# Patient Record
Sex: FEMALE | Race: WHITE | NOT HISPANIC OR LATINO | Employment: FULL TIME
[De-identification: names, ages, dates, MRNs, and addresses within clinical notes are randomized per-mention and may not be internally consistent; named-entity substitution may affect disease eponyms.]

---

## 2017-09-17 ENCOUNTER — HEALTH MAINTENANCE LETTER (OUTPATIENT)
Age: 32
End: 2017-09-17

## 2020-08-11 ENCOUNTER — VIRTUAL VISIT (OUTPATIENT)
Dept: SURGERY | Facility: CLINIC | Age: 35
End: 2020-08-11
Payer: COMMERCIAL

## 2020-08-11 VITALS — BODY MASS INDEX: 45.99 KG/M2 | WEIGHT: 293 LBS | HEIGHT: 67 IN

## 2020-08-11 DIAGNOSIS — E66.813 CLASS 3 SEVERE OBESITY DUE TO EXCESS CALORIES WITH SERIOUS COMORBIDITY AND BODY MASS INDEX (BMI) OF 60.0 TO 69.9 IN ADULT (H): Primary | ICD-10-CM

## 2020-08-11 DIAGNOSIS — E66.01 CLASS 3 SEVERE OBESITY DUE TO EXCESS CALORIES WITH SERIOUS COMORBIDITY AND BODY MASS INDEX (BMI) OF 60.0 TO 69.9 IN ADULT (H): Primary | ICD-10-CM

## 2020-08-11 RX ORDER — TOPIRAMATE 25 MG/1
TABLET, FILM COATED ORAL
Qty: 90 TABLET | Refills: 1 | Status: SHIPPED | OUTPATIENT
Start: 2020-08-11 | End: 2020-11-02

## 2020-08-11 SDOH — HEALTH STABILITY: MENTAL HEALTH: HOW OFTEN DO YOU HAVE A DRINK CONTAINING ALCOHOL?: NEVER

## 2020-08-11 ASSESSMENT — PAIN SCALES - GENERAL: PAINLEVEL: NO PAIN (0)

## 2020-08-11 ASSESSMENT — MIFFLIN-ST. JEOR: SCORE: 2597.38

## 2020-08-11 NOTE — PROGRESS NOTES
"Grecia MORALEZ is a 35 year old female who is being evaluated via a billable video visit.      The patient has been notified of following:     \"This video visit will be conducted via a call between you and your physician/provider. We have found that certain health care needs can be provided without the need for an in-person physical exam.  This service lets us provide the care you need with a video conversation.  If a prescription is necessary we can send it directly to your pharmacy.  If lab work is needed we can place an order for that and you can then stop by our lab to have the test done at a later time.    Video visits are billed at different rates depending on your insurance coverage.  Please reach out to your insurance provider with any questions.    If during the course of the call the physician/provider feels a video visit is not appropriate, you will not be charged for this service.\"    Patient has given verbal consent for Video visit? Yes  How would you like to obtain your AVS? MyChart  If you are dropped from the video visit, the video invite should be resent to: Text to cell phone: [phone]  Will anyone else be joining your video visit? No        Video-Visit Details    Type of service:  Video Visit    Video Start Time: 8:36 AM -9:01 (disconnected on Buzz[employer])  Video End Time:9:04- 9:26 AM  (finished visit with telephone)     Originating Location (pt. Location): Home    Distant Location (provider location):  [facility]     Platform used for Video Visit: Starteed    New Bariatric Nutrition Consultation Note    Reason For Visit: Nutrition Assessment    Grecia MORALEZ is a 35 year old presenting today for new bariatric nutrition consult.   Pt is interested in laparoscopic sleeve gastrectomy with Dr. Gomez expected surgery in Nov 2020.  Patient is accompanied by self.  This is pt's first of 3 required nutrition visits prior to surgery.     Pt referred by Cierra Caballero NP on " "August 11, 2020.  Patient with Co-morbidities of obesity including:  Type II DM no  Renal Failure no  Sleep apnea no  Hypertension no   Dyslipidemia no  Joint pain no  Back pain no  GERD yes     Support System Reviewed With Patient 8/6/2020   Who do you have in your support network that can be available to help you for the first 2 weeks after surgery? Yes.   Who can you count on for support throughout your weight loss surgery journey? Yes.       ANTHROPOMETRICS:  Estimated body mass index is 64.22 kg/m  as calculated from the following:    Height as of an earlier encounter on 8/11/20: 1.702 m (5' 7\").    Weight as of an earlier encounter on 8/11/20: 186 kg (410 lb).    Required weight loss goal pre-op: 41 lbs from initial consult weight (goal weight 369 lbs or less before surgery)       8/6/2020   I have tried the following methods to lose weight Watching portions or calories, Exercise, Slimfast       Weight Loss Questions Reviewed With Patient 8/6/2020   How long have you been overweight? 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 Works [employer].   At home she has 3 kids and a , and feel like she has no time for herself.     She has previously lost weight with meal prepping but it is not always sustainable. She grabs and goes during the work day, some day she does not eat until diner time. Will snack before and after dinner.      Recall Diet Questions Reviewed With Patient 8/6/2020   Describe what you typically consume for breakfast (typical or most recent): Nothing   Describe what you typically consume for lunch (typical or most recent): Sand, chips, water   Describe what you typically consume for supper (typical or most recent): Meat, veggie, pot/noodles   Describe what you typically consume as snacks (typical or most recent): Granola bars, crackers   How many ounces of " water, or other low calorie drinks, do you drink daily (8 oz=1 glass)? 48 oz   How many ounces of caffeine (coffee, tea, pop) do you drink daily (8 oz=1 glass)? 8 oz   How many ounces of carbonated (pop, beer, sparkling water) drinks do you drinky daily (8 oz=1 glass)? 16 oz   How many ounces of juice, pop, sweet tea, sports drinks, protein drinks, other sweetened drinks, do you drink daily (8 oz=1 glass)? 8 oz   How many ounces of milk do you drink daily (8 oz=1 glass) 8 oz   Please indicate the type of milk: 1%   How often do you drink alcohol? Never       Eating Habits 8/6/2020   Do you have any dietary restrictions? No   Do you currently binge eat (eat a large amount of food in a short time)? Yes   Are you an emotional eater? Yes   Do you get up to eat after falling asleep? No   What foods do you crave? Junk.       ADDITIONAL INFORMATION:    Dining Out History Reviewed With Patient 8/6/2020   How often do you dine out? Rarely.   Where do you dine out? (select all that apply) take out   What types of food do you order when you dine out? Chicken Sand's, fries       Physical Activity Reviewed With Patient 8/6/2020   How often do you exercise? 3 to 4 times per week   What is the duration of your exercise (in minutes)? 30 Minutes   What types of exercise do you do? walking   What keeps you from being more active?  Pain, Shortness of breath, Worried people will look at me       MALNUTRITION  Video Visit: Visual NFPE from shoulders up   % Intake: No decreased intake noted  % Weight Loss: None noted  Subcutaneous Fat Loss: None observed  Muscle Loss: None observed  Fluid Accumulation/Edema: Unable to assess  Malnutrition Diagnosis: Patient does not meet two of the established criteria necessary for diagnosing malnutrition    NUTRITION DIAGNOSIS:  Obesity r/t long history of self-monitoring deficit and excessive energy intake aeb BMI >30 kg/m2.    INTERVENTION:  Intervention Provided/Education Provided on post-op diet  "guidelines, vitamins/minerals essential post-operatively, GI anatomy of bariatric surgeries, ways to help prepare for post-op diet guidelines pre-operatively, portion/calorie-control, mindful eating and sources of protein.  Provided pt with list of goals RD contact information.      Questions Reviewed With Patient 2020   How ready are you to make changes regarding your weight? Number 1 = Not ready at all to make changes up to 10 = very ready. 10   How confident are you that you can change? 1 = Not confident that you will be successful making changes up to 10 = very confident. 10       Patient Understanding: good  Expected Compliance: good    GOALS:  Relating To Eatin. Eat slowly (20-30 minutes per meal), chewing foods well (25 chews per bite/applesauce consistency)  2. 9\" Plate method (1/2 plate non-starchy vegetables/fruit, 1/4 plate lean protein, 1/4 plate whole grain starch - no more than 1/2 cup carb/meal)  3. Eat 3 meals per day  - Breakfast try a protein shake as discussed     *Protein Shake Criteria: no more than 210 Calories, at least 20 grams of protein, and less than 10 grams of sugar     Meal Replacement Shake Options:   [facility] smoothie (160 Calories, 20 g protein)   Premier Protein (160 Calories, 30 g protein)  Slim Fast Advanced Nutrition (180 Calories, 20 g protein)  Muscle Milk, lactose-free, 17 oz bottle (210 Calories, 30 g protein)  Integrated Supplements, no artificial sugars (110 Calories, 20 g protein)  Genepro, unflavored protein powder (60 Calories, 30 g protein)    Meal Replacement Bar Options:  [facility] Protein Shake (160 Calories, 15 g protein)  Quest Protein Bars (190 Calories, 20 g protein)  Built Bar (170 Calories, 15-20 g protein)  One Protein Bar (210 calories, 20 g protein)  [place] Signature Protein Bar ([employer]) (190 Calories, 21 g protein)  Pure Protein Bars (180 Calories, 21 g protein)    You may purchase meal replacement products online at our e-store. Visit " e-store at [email or web address]   - Recommend the [facility] protein shakes (based powder + flavor packet), if interested Recommend trying the variety box to see flavor preferences.  - For recipes and ideas on how to use products, visit - SparkBase    4. Meals prep on Sunday for lunches:   - Purchase and portion out to go items:  -- apples, banana, oranges, portion out grapes or berries  -- Vegetables and dip (have vegetables clean and cut and dip portioned out  -- Single serving Georgian or Romanian (sykr) yogurt or cottage cheese   -- Sting cheese   -- 5-6 crackers and 2-3 slices of cheese  -- deli meat and a piece of string cheese   -- hard boiled eggs or egg salad with vegetables and 5-6 crackers     Relating to beverages:  Reduce caffeine/carbonation/calorie containing beverages  Separate fluids from meals by 30 minutes before, during, and after eating    Relating to dietary supplements:  Start a multivitamin containing iron daily    Initial Consult / Weight Loss    My Plate Planner_English - Pt Education  [email or web address]    Protein Sources for Weight Loss  [email or web address]       Time spent with patient: 46 minutes.  Halie Rosado, MS, RD, LD

## 2020-08-11 NOTE — PROGRESS NOTES
"Grecia MORALEZ is a 35 year old female who is being evaluated via a billable video visit.      The patient has been notified of following:     \"This video visit will be conducted via a call between you and your physician/provider. We have found that certain health care needs can be provided without the need for an in-person physical exam.  This service lets us provide the care you need with a video conversation.  If a prescription is necessary we can send it directly to your pharmacy.  If lab work is needed we can place an order for that and you can then stop by our lab to have the test done at a later time.    Video visits are billed at different rates depending on your insurance coverage.  Please reach out to your insurance provider with any questions.    If during the course of the call the physician/provider feels a video visit is not appropriate, you will not be charged for this service.\"    Patient has given verbal consent for Video visit? Yes  How would you like to obtain your AVS? MyChart  If you are dropped from the video visit, the video invite should be resent to: Text to cell phone: [phone]  Will anyone else be joining your video visit? No      Video-Visit Details    Type of service:  Video Visit    Video Start Time: 802  Video End Time: 832    Originating Location (pt. Location): Home    Distant Location (provider location):  [facility]     Platform used for Video Visit: Kartik Caballero NP          New Bariatric Surgery Consultation Note    2020    RE: Grecia MORALEZ  MR#: [id number]  : 1985      Referring provider: No flowsheet data found.    Chief Complaint/Reason for visit: evaluation for possible weight loss surgery    Dear Kayla Arcos (General),    I had the pleasure of seeing your patient, Grecia MORALEZ, to evaluate her obesity and consider her for possible weight loss surgery. As you know, Grecia MORALEZ is 35 year " "old.  She has a height of 5' 7\", a weight of 410 lbs 0 oz, and calculated Body mass index is 64.22 kg/m .      HISTORY OF PRESENT ILLNESS:  Weight Loss History Reviewed with Patient 2020   How long have you been overweight? From Middle age and beyond   What is the most that you have ever weighed? 420   What is the most weight you have lost? 0   I have tried the following methods to lose weight Watching portions or calories, Exercise, Slimfast   I have tried the following weight loss medications? (Check all that apply) None   Have you ever had weight loss surgery? No     Has always struggled with weight- gradual over time   More weight gain with each child (3boys)   Has never been able to lose weight with diet/ exercise     Has been considering surgery for a while. Several friends have had surgery     CO-MORBIDITIES OF OBESITY INCLUDE:     2020   I have the following health issues associated with obesity: None of the above   I have the following symptoms associated with obesity: Lower Extremity Swelling, Fatigue     GERD: well controlled omeprazole once daily - 5-10 year history, worse with specific foods and carbonation     Sleep: Snores, no difficulty staying asleep, wakes rested, doesn't stop breathing at night    No history blood clots     PAST MEDICAL HISTORY:  No past medical history on file.    PAST SURGICAL HISTORY:  Past Surgical History:   Procedure Laterality Date     AS REMOVAL OF OVARIAN CYST(S)        SECTION      x2       FAMILY HISTORY:   No family history on file.    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 Yes   Who do you have in your support network that can be available to help you for the first 2 weeks after surgery? " Yes.   Who can you count on for support throughout your weight loss surgery journey? Yes.   Can you afford 3 meals a day?  Yes   Can you afford 50-60 dollars a month for vitamins? Yes       HABITS:     8/6/2020   How often do you drink alcohol? Never   Have you ever used any of the following nicotine products? No   Have you or are you currently using street drugs or prescription strength medication for which you do not have a prescription for? No   Do you have a history of chemical dependency (alcohol or drug abuse)? No       PSYCHOLOGICAL HISTORY:   Psychological History Reviewed With Patient 8/6/2020   Have you ever attempted suicide? Never.   Have you had thoughts of suicide in the past year? No   Have you ever been hospitalized for mental illness or a suicide attempt? Never.   Do you have a history of chronic pain? No   Have you ever been diagnosed with fibromyalgia? No   Are you currently seeing a therapist or counselor?  No   Are you currently seeing a psychiatrist? No       ROS:     8/6/2020   Skin:  None of the above   HEENT: None of these   Musculoskeletal: None of the above   Cardiovascular: Shortness of breath with activity   Pulmonary: Shortness of breath with activity, Snoring   Gastrointestinal: Reflux   Genitourinary: None of the above   Hematological: None of the above   Neurological: None of the above   Female only: None of the above       EATING BEHAVIORS:     8/6/2020   Have you or anyone else thought that you had an eating disorder? Yes   If you answered yes to the previous eating disorder question, select the types that apply from this list: Binge Eating   Do you currently binge eat (eat a large amount of food in a short time)? Yes   Are you an emotional eater? Yes   Do you get up to eat after falling asleep? No     Doesn't make time to make healthy decisions  Grabbing and going- busy lifestyle   emotional eating  Snacking throughout the day - doesn't eat true meal until dinner   Done eating at  "dinner time   Lately has been feeling fatigued after latest meal of the day - blah   Important to eat what ever is in front of her   If eats breakfast- very hungry all day       EXERCISE:     8/6/2020   How often do you exercise? 3 to 4 times per week   What is the duration of your exercise (in minutes)? 30 Minutes   What types of exercise do you do? walking   What keeps you from being more active?  Pain, Shortness of breath, Worried people will look at me       MEDICATIONS:  Current Outpatient Medications   Medication Sig Dispense Refill     omeprazole (PRILOSEC) 20 MG DR capsule Take 20 mg by mouth as needed       Has taken topamax with benefit at 25 mg     ALLERGIES:  Allergies   Allergen Reactions     Latex Rash       LABS/IMAGING/MEDICAL RECORDS REVIEW:     PHYSICAL EXAM:  Ht 1.702 m (5' 7\")   Wt (!) 186 kg (410 lb)   BMI 64.22 kg/m        In summary, Grecia MORALEZ has Class III obesity with a body mass index of Body mass index is 64.22 kg/m . kg/m2 and the comorbidities stated above. She completed an informational seminar and is a candidate for the laparoscopic gastric sleeve.  She will have to complete the following pre-requisites:    Received weight loss goal of 41 lb prior to surgery. 369  3 dietitian visits- recommend monthly until surgery   Have preoperative laboratory tests drawn.  Psychological Evaluation with MMPI and clearance for weight loss surgery.  Letter of clearance from the following EGD, primary care (address blood pressure)   Starting topamax- taper to 75mg, has tolerated 25 mg in the past. No history of kidney stones or glaucoma  GLP1- consider in future if DM  Labs need to be sent to [facility]     Is patient currently taking narcotic/opioid medications? No    Today in the office we discussed gastric sleeve surgery. Preoperative, perioperative, and postoperative processes, management, and follow up were addressed.  Risks and benefits were outlined including the risk of death, staple line " leak (1-2%), PE, DVT, ulcer, worsening GERD, N/V, stricture, hernia, wound infection, weight regain, and vitamin deficiencies. I emphasized exercise and activity along with appropriate food choice as the main foundation for weight loss with surgery providing surgical reinforcement of this.  All questions were answered.  A goal sheet and support group handout were given to the patient.    Once the patient has completed the requirements in their task list and there are no further recommendations, the pt will be allowed to see the surgeon of her choice for consultation on the laparoscopic gastric sleeve surgery. Patient verbalizes understanding of the process to surgery and expectations for the postoperative period including the need for lifelong lifestyle changes, vitamin supplementation, and laboratory monitoring.    PCP is Kayla Arcos 9/2019- recommendation letter written, prior to bariatric consult. will need updated   Sincerely,     Cierra Caballero NP    I spent a total of 30 minutes face to face with the patient during today's office visit. Over 50% of this time was spent counseling the patient and/or coordinating care.      Bariatric Task List  Status:  Is patient a candidate for bariatric surgery?:  patient is a candidate for bariatric surgery -     Cleared to schedule surgeon consult?:    -     Status:    -     Surgeon: Dr. Gomez  -     Tentative surgery month/year: November 2020 -        Insurance: Insurance:  [place]  -     Cigna: PCP Recommendation and Medical Clearance:    -     HP Referral:    -     Other:    -        Patient Info: Initial Weight:  410 -     Date of Initial Weight/Height:  8/11/2020 -     Goal Weight (lbs):  369 -     Required Weight Loss:  41 -     Surgery Type:  sleeve gastrectomy -     Multidisciplinary Meeting:    -        Dietician Visits: Structured weight loss required by insurance?:  no structured weight loss required -     Dietician Visit 1:  Needed -     Dietician Visit 2:   "Needed -     Dietician Visit 3:  Needed -     Dietician Visit 4:    -     Dietician Visit 5:    -     Dietician Visit 6:    -     Dietician Visit additional:    -     Clearance from dietician to see surgeon?:    -     Dietician Notes:    -        Psychological Evaluation: Psych eval:  Needed -     Therapist letter of support:    -     Psychiatrist letter of support:    -     Establish care with therapist:    -     Complete eating disorder evaluation:    -     Letter of clearance from therapist/eating disorder program:    -     Other:    -        Lab Work: Complete Blood Count:  Needed -     Comprehensive Metabolic Panel:  Needed -     Vitamin D:  Needed -     Hgb A1c:  Needed -     PTH:  Needed -     H. pylori:    -     TSH:  Needed -     Nicotine Testing:    -     Other:    -        Consults/ Clearance: Sleep Medicine:    -     Cardiac:    -     Pain:   -     Dental:    -     Endocrine:    -     Gastroenterology:    -     Vascular Medicine:    -     Hematology:    -     Medical Weight Management:   -     Physical Therapy/Exercise:    -     Nephrology:    -     Neurology:    -     Pulmonology:    -     Rheumatology:    -     Other    -     Other    -     Other    -           Testing: UGI:    -     EGD:  Needed -     Other:    -        PCP: Establish care with PCP:  Completed -     Follow up with PCP:    -     PCP letter of support:  Needed -        Smoking: Quit tobacco use (3 months smoke free)?:    -     Quit date:    -        Patient Education:  Information Session:  Completed -     Given \"Making your decision\" handout?:  Given \"\"Making your decision\"\" handout? -     Given support group information?:  support group contact information provided -     Attended support group?:    -     Support plan in place?:  Completed -     Research consents signed?:    -        Additional Surgery Requirements: Review Coag plan:    -     HgA1c <8:    -     Inpatient pain consult:    -     Final nicotine screen:    -     Dental " work complete:    -     Birth control plan:    -     Other Requirements:    -     Other Requirements:    -        Final Tasks:  Before surgery online class:  Needed -     Before surgery online class website link:  [email or web address]   After surgery online class:  Needed -     After surgery online class website link:  [email or web address]   Nurse visit for weigh-in and information:  Needed -     Pre-assessment clinic visit with anesthesia team for H&P:  Needed -     Final labs (Hgb, plt, T&S, UA):  Needed -        Notes:   -

## 2020-08-11 NOTE — NURSING NOTE
"Weight Problem   New Bariatric Surgery      ( Weight: (!) 186 kg (410 lb)(Pt reported) )  ( Height: 170.2 cm (5' 7\") )  ( BMI (Calculated): 64.21 )  (   )  ( Cumulative weight loss (lbs): 0 )  (   )  (   )  (   )  (   )    (   )  (   )  (   )  (   )  (   )  (   )  (   )    ( There is no problem list on file for this patient.   )  (   No current outpatient medications on file.    )  ( Diabetes Eval:    )    ( Pain Eval:  No Pain (0) )    ( Wound Eval:       )    (   History   Smoking Status     Never Smoker   Smokeless Tobacco     Never Used    )    ( Signed By:  Sowmya Petersen CMA; August 11, 2020; 7:53 AM )    "

## 2020-08-11 NOTE — LETTER
"8/11/2020       RE: Grecia MORALEZ  [address]  [facility] [address]     Dear Colleague,    Thank you for referring your patient, Grecia MORALEZ, to the [facility]IGHT MANAGEMENT at [facility]. Please see a copy of my visit note below.    Grecia MORALEZ is a 35 year old female who is being evaluated via a billable video visit.        Video-Visit Details    Type of service:  Video Visit    Video Start Time: 8:36 AM -9:01 (disconnected on [employer])  Video End Time:9:04- 9:26 AM  (finished visit with telephone)     Originating Location (pt. Location): Home    Distant Location (provider location):  [facility]GICAL [facility] MANAGEMENT     Platform used for Video Visit: [employer]    New Bariatric Nutrition Consultation Note    Reason For Visit: Nutrition Assessment    Grecia MORALEZ is a 35 year old presenting today for new bariatric nutrition consult.   Pt is interested in laparoscopic sleeve gastrectomy with Dr. Gomez expected surgery in Nov 2020.  Patient is accompanied by self.  This is pt's first of 3 required nutrition visits prior to surgery.     Pt referred by Cierra Caballero NP on August 11, 2020.  Patient with Co-morbidities of obesity including:  Type II DM no  Renal Failure no  Sleep apnea no  Hypertension no   Dyslipidemia no  Joint pain no  Back pain no  GERD yes     Support System Reviewed With Patient 8/6/2020   Who do you have in your support network that can be available to help you for the first 2 weeks after surgery? Yes.   Who can you count on for support throughout your weight loss surgery journey? Yes.       ANTHROPOMETRICS:  Estimated body mass index is 64.22 kg/m  as calculated from the following:    Height as of an earlier encounter on 8/11/20: 1.702 m (5' 7\").    Weight as of an earlier encounter on 8/11/20: 186 kg (410 lb).    Required weight loss goal pre-op: 41 lbs from initial consult weight (goal weight 369 lbs or less before " surgery)       8/6/2020   I have tried the following methods to lose weight Watching portions or calories, Exercise, Slimfast       Weight Loss Questions Reviewed With Patient 8/6/2020   How long have you been overweight? 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 Works M-Fri.   At home she has 3 kids and a , and feel like she has no time for herself.     She has previously lost weight with meal prepping but it is not always sustainable. She grabs and goes during the work day, some day she does not eat until diner time. Will snack before and after dinner.      Recall Diet Questions Reviewed With Patient 8/6/2020   Describe what you typically consume for breakfast (typical or most recent): Nothing   Describe what you typically consume for lunch (typical or most recent): Sand, chips, water   Describe what you typically consume for supper (typical or most recent): Meat, veggie, pot/noodles   Describe what you typically consume as snacks (typical or most recent): Granola bars, crackers   How many ounces of water, or other low calorie drinks, do you drink daily (8 oz=1 glass)? 48 oz   How many ounces of caffeine (coffee, tea, pop) do you drink daily (8 oz=1 glass)? 8 oz   How many ounces of carbonated (pop, beer, sparkling water) drinks do you drinky daily (8 oz=1 glass)? 16 oz   How many ounces of juice, pop, sweet tea, sports drinks, protein drinks, other sweetened drinks, do you drink daily (8 oz=1 glass)? 8 oz   How many ounces of milk do you drink daily (8 oz=1 glass) 8 oz   Please indicate the type of milk: 1%   How often do you drink alcohol? Never       Eating Habits 8/6/2020   Do you have any dietary restrictions? No   Do you currently binge eat (eat a large amount of food in a short time)? Yes   Are you an emotional eater? Yes   Do you get up to eat after falling  asleep? No   What foods do you crave? Junk.       ADDITIONAL INFORMATION:    Dining Out History Reviewed With Patient 2020   How often do you dine out? Rarely.   Where do you dine out? (select all that apply) take out   What types of food do you order when you dine out? Chicken Sand's, fries       Physical Activity Reviewed With Patient 2020   How often do you exercise? 3 to 4 times per week   What is the duration of your exercise (in minutes)? 30 Minutes   What types of exercise do you do? walking   What keeps you from being more active?  Pain, Shortness of breath, Worried people will look at me       MALNUTRITION  Video Visit: Visual NFPE from shoulders up   % Intake: No decreased intake noted  % Weight Loss: None noted  Subcutaneous Fat Loss: None observed  Muscle Loss: None observed  Fluid Accumulation/Edema: Unable to assess  Malnutrition Diagnosis: Patient does not meet two of the established criteria necessary for diagnosing malnutrition    NUTRITION DIAGNOSIS:  Obesity r/t long history of self-monitoring deficit and excessive energy intake aeb BMI >30 kg/m2.    INTERVENTION:  Intervention Provided/Education Provided on post-op diet guidelines, vitamins/minerals essential post-operatively, GI anatomy of bariatric surgeries, ways to help prepare for post-op diet guidelines pre-operatively, portion/calorie-control, mindful eating and sources of protein.  Provided pt with list of goals RD contact information.      Questions Reviewed With Patient 2020   How ready are you to make changes regarding your weight? Number 1 = Not ready at all to make changes up to 10 = very ready. 10   How confident are you that you can change? 1 = Not confident that you will be successful making changes up to 10 = very confident. 10       Patient Understanding: good  Expected Compliance: good    GOALS:  Relating To Eatin. Eat slowly (20-30 minutes per meal), chewing foods well (25 chews per bite/applesauce  "consistency)  2. 9\" Plate method (1/2 plate non-starchy vegetables/fruit, 1/4 plate lean protein, 1/4 plate whole grain starch - no more than 1/2 cup carb/meal)  3. Eat 3 meals per day  - Breakfast try a protein shake as discussed     *Protein Shake Criteria: no more than 210 Calories, at least 20 grams of protein, and less than 10 grams of sugar     Meal Replacement Shake Options:   [facility] smoothie (160 Calories, 20 g protein)   Premier Protein (160 Calories, 30 g protein)  Slim Fast Advanced Nutrition (180 Calories, 20 g protein)  Muscle Milk, lactose-free, 17 oz bottle (210 Calories, 30 g protein)  Integrated Supplements, no artificial sugars (110 Calories, 20 g protein)  Genepro, unflavored protein powder (60 Calories, 30 g protein)    Meal Replacement Bar Options:  [facility] Protein Shake (160 Calories, 15 g protein)  Quest Protein Bars (190 Calories, 20 g protein)  Built Bar (170 Calories, 15-20 g protein)  One Protein Bar (210 calories, 20 g protein)  [place] Signature Protein Bar ([employer]) (190 Calories, 21 g protein)  Pure Protein Bars (180 Calories, 21 g protein)    You may purchase meal replacement products online at our e-store. Visit e-store at [email or web address]   - Recommend the [facility] protein shakes (based powder + flavor packet), if interested Recommend trying the variety box to see flavor preferences.  - For recipes and ideas on how to use products, visit - [employer]    4. Meals prep on Sunday for lunches:   - Purchase and portion out to go items:  -- apples, banana, oranges, portion out grapes or berries  -- Vegetables and dip (have vegetables clean and cut and dip portioned out  -- Single serving Portuguese or Citizen of [place] (sykr) yogurt or cottage cheese   -- Sting cheese   -- 5-6 crackers and 2-3 slices of cheese  -- deli meat and a piece of string cheese   -- hard boiled eggs or egg salad with vegetables and 5-6 crackers     Relating to beverages:  Reduce " caffeine/carbonation/calorie containing beverages  Separate fluids from meals by 30 minutes before, during, and after eating    Relating to dietary supplements:  Start a multivitamin containing iron daily    Initial Consult / Weight Loss    My Plate Planner_English - Pt Education  [email or web address]    Protein Sources for Weight Loss  [email or web address]       Time spent with patient: 46 minutes.  Halie Rosado, MS, RD, LD

## 2020-08-11 NOTE — Clinical Note
New Bariatric.   Tatyana, can you please send her surgery packet information? Clearances just from primary care and psych consult. Also needs EGD which is ordered.   Thanks!

## 2020-08-11 NOTE — PATIENT INSTRUCTIONS
"GOALS:  Relating To Eatin. Eat slowly (20-30 minutes per meal), chewing foods well (25 chews per bite/applesauce consistency)  2. 9\" Plate method (1/2 plate non-starchy vegetables/fruit, 1/4 plate lean protein, 1/4 plate whole grain starch - no more than 1/2 cup carb/meal)  3. Eat 3 meals per day  - Breakfast try a protein shake as discussed     *Protein Shake Criteria: no more than 210 Calories, at least 20 grams of protein, and less than 10 grams of sugar     Meal Replacement Shake Options:   [facility] smoothie (160 Calories, 20 g protein)   Premier Protein (160 Calories, 30 g protein)  Slim Fast Advanced Nutrition (180 Calories, 20 g protein)  Muscle Milk, lactose-free, 17 oz bottle (210 Calories, 30 g protein)  Integrated Supplements, no artificial sugars (110 Calories, 20 g protein)  Genepro, unflavored protein powder (60 Calories, 30 g protein)    Meal Replacement Bar Options:  [facility] Protein Shake (160 Calories, 15 g protein)  Quest Protein Bars (190 Calories, 20 g protein)  Built Bar (170 Calories, 15-20 g protein)  One Protein Bar (210 calories, 20 g protein)  [place] Signature Protein Bar ([employer]) (190 Calories, 21 g protein)  Pure Protein Bars (180 Calories, 21 g protein)    You may purchase meal replacement products online at our e-store. Visit e-store at [email or web address]   - Recommend the [facility] protein shakes (based powder + flavor packet), if interested Recommend trying the variety box to see flavor preferences.  - For recipes and ideas on how to use products, visit - [employer]    4. Meals prep on  for lunches:   - Purchase and portion out to go items:  -- apples, banana, oranges, portion out grapes or berries  -- Vegetables and dip (have vegetables clean and cut and dip portioned out  -- Single serving [place]istanian or [place]ian (sykr) yogurt or cottage cheese   -- Sting cheese   -- 5-6 crackers and 2-3 slices of cheese  -- deli meat and a piece of string cheese   -- " hard boiled eggs or egg salad with vegetables and 5-6 crackers     Relating to beverages:  Reduce caffeine/carbonation/calorie containing beverages  Separate fluids from meals by 30 minutes before, during, and after eating    Relating to dietary supplements:  Start a multivitamin containing iron daily    Initial Consult / Weight Loss    My Plate Planner_English - Pt Education  [email or web address]    Protein Sources for Weight Loss  [email or web address]     Follow up with RD in one month    Halie Rosado, MS, RD, LD  If you need to schedule or reschedule with a dietitian please call [phone].

## 2020-08-11 NOTE — PATIENT INSTRUCTIONS
"Thank you for allowing us the privilege of caring for you. We hope we provided you with the excellent service you deserve.   Please let us know if there is anything else we can do for you so that we can be sure you are completely satisfied with your care experience.    To ensure the quality of our services you may be receiving a patient satisfaction survey from an independent patient satisfaction monitoring company.    The greatest compliment you can give is a \"Likely to Recommend\"    Your visit was with Cierra Caballero NP today.    Instructions per today's visit:   Read Bariatric Surgery packet- in [facility] message  Dietitian visits x 3- recommend monthly until surgery   See Dr. Gomez in 1 month - in person or virtual   Schedule Psych Consult - see [facility] message   Schedule Clearances- Upper Endoscopy  Follow up with me in 1-2 month - med check   Labs ordered will be faxed to your local clinic   Complete HARRIETT and send back to clinic   Start Topamax     Call Ronak Saba at [phone] to discuss insurance coverage for bariatric surgery.  Please check with your insurance regarding bariatric surgery coverage also.        Please call our contact center at [phone] to schedule your next appointments.  To find a lab location near you, please call [phone].  For any nursing questions or concerns call Annabelle Mark LPN at [phone] or Tatyana Don RN at [phone]  Please call during clinic hours Monday through Friday 8:00a - 4:00p if you have questions or you can contact us via LiveRSVP at anytime and we will reply during clinic hours.    Lab results will be communicated through My Chart or letter (if My Chart not used). Please call the clinic if you have not received communication after 1 week or if you have any questions.?  Clinic Fax: [phone]  ______________________________________________________________________________________________________________________  Meal Replacement Products:    Here is " the link to our new e-store where you can purchase our meal replacement products     [email or web address] [place] E-Store  [email or web address]    The one week starter kit is a great way to sample a variety of products and see what works for you.    If you want more information about the product go to: [email or web address]    If you are an employee or [facility] or [facility] please contact your care team for a 10% estore discount    Free Shipping for orders over $75     Benefits of meal replacements products:    Portion and calorie control  Improved nutrition  Structured eating  Simplified food choices  Avoid contact with trigger foods  _________________________________________________________________________________________________________________________________  Interested in working with a health ?  Health coaches work with you to improve your overall health and wellbeing.  They look at the whole person, and may involve discussion of different areas of life, including, but not limited to the four pillars of health (sleep, exercise, nutrition, and stress management). Discuss with your care team if you would like to start working a health .  Health Coaching-3 Pack: Schedule by calling [phone]    $99 for three health coaching visits    Visits may be done in person or via phone    Coaching is a partnership between the  and the client; Coaches do not prescribe or diagnose    Coaching helps inspire the client to reach his/her personal goals   _________________________________________________________________________________________________________________________________  Healthy Lifestyle Support Group   [facility] Weight Management Program  Virtual Support Group Now Available    60 minutes of small group guided discussion, support and resources    Led by Health , Cristy Harden    For questions, and to receive the invite information, contact Cristy coto  [email or web address]    All our welcome!    One Friday per month, 12:30pm to 1:30pm  SELF COMPASSION: July 31st  CREATING MY WELLNESS VISION: August 28th  MINDFULNESS PRACTICES FOR A CALM BODY & MIND: September 25th  MINDFUL EATING TOOLS: October 30th  HEALTHY& HAPPY HOLIDAYS: November 20th  OPEN FORUM: December 18th  _______________________________________________________________________________________________________________________________  Connections: Bariatric Care support group  Due to the Covid-19 restrictions, we will be doing our support group virtually using Microsoft Teams. You will need to get an invitation to the group from Jonny Bonilla, Ph.D., LP at [email or web address] and to learn about using Microsoft Teams. The next meeting is on Tuesday, July 14 between 6:30 and 8 PM and there will be no formal speaker.    This group meets the second Tuesday of each month from 6:30 to 8 PM and will be held again at [facility] in the [address] [place] [address] (A-B room) when the Covid-19 restrictions are lifted. The group is led by Jonny Bonilla, Ph.D., Licensed Psychologist, [facility] Comprehensive Weight Management Program. There is no cost for group participation and is open to all [facility] (and those external to this program) pre- and post-operative bariatric surgery patients as well as their support system.   _________________________________________________________________________________________________________________________________  [facility] Get Moving Program  Our team of physical therapists is trained to help you understand and take control of your condition. They will perform a thorough evaluation to determine your ability for activity and develop a customized plan to fit your goals and physical ability.  Scheduling: Unsure if the Get Moving program is right for you? Discuss the program with your medical provider or diabetes educator.  You can also call us at [phone] to ask questions or schedule an appointment.   [facility] Get Moving Program  ______________________________________________________________________________________________________________________  [facility] Diabetes Prevention Program (DPP)  If you have prediabetes and Medicare please contact us by MyChart or phone to learn more about our Diabetes Prevention Program  _______________________________________________________________________________________________________________________  Bluetooth Scale:    We hope to provide you with high quality telephone and virtual healthcare visits while social distancing for COVID-19 is necessary, as well as in the future when virtual visits may be more convenient for you.     Our technology team made it possible for Bluetooth scales to send weight measurements to our electronic medical record. This allows weights from you weighing at home to securely flow into the medical record, which will improve telephone and virtual visits.   Additionally, studies have shown that adults actually lose more weight when their weights are automatically sent to someone else, and also that this process is not stressful for those adults.    Below is a link for purchasing the scale, with a discount code for our patients. You may call your insurance company to see if they will reimburse you for the cost of the scale, as a piece of durable medical equipment. The scales only go up to a weight of 400 pounds. This is an issue and we are working with the developer on increasing this. We found no scales that go over 400lb that have blue-tooth for connecting to [employer].    Scale to purchase: the [employer]  Body  Scale: [email or web address]    Discount Code: We have a discount code for our patients to bring the cost down to $50, please contact your care team for discount code  and instructions on how to set up scale.      Thank you,   [facility] Comprehensive Weight Management Team  Bariatric Task List  Status:  Is patient a candidate for bariatric surgery?:  patient is a candidate for bariatric surgery -     Cleared to schedule surgeon consult?:    -     Status:    -     Surgeon: Dr. Gomez  -     Tentative surgery month/year: November 2020 -        Insurance: Insurance:  [place]  -     Cigna: PCP Recommendation and Medical Clearance:    -     HP Referral:    -     Other:    -        Patient Info: Initial Weight:  410 -     Date of Initial Weight/Height:  8/11/2020 -     Goal Weight (lbs):  369 -     Required Weight Loss:  41 -     Surgery Type:  sleeve gastrectomy -     Multidisciplinary Meeting:    -        Dietician Visits: Structured weight loss required by insurance?:  no structured weight loss required -     Dietician Visit 1:  Needed -     Dietician Visit 2:  Needed -     Dietician Visit 3:  Needed -     Dietician Visit 4:    -     Dietician Visit 5:    -     Dietician Visit 6:    -     Dietician Visit additional:    -     Clearance from dietician to see surgeon?:    -     Dietician Notes:    -        Psychological Evaluation: Psych eval:  Needed -     Therapist letter of support:    -     Psychiatrist letter of support:    -     Establish care with therapist:    -     Complete eating disorder evaluation:    -     Letter of clearance from therapist/eating disorder program:    -     Other:    -        Lab Work: Complete Blood Count:  Needed -     Comprehensive Metabolic Panel:  Needed -     Vitamin D:  Needed -     Hgb A1c:  Needed -     PTH:  Needed -     H. pylori:    -     TSH:  Needed -     Nicotine Testing:    -     Other:    -        Consults/ Clearance: Sleep Medicine:    -     Cardiac:    -     Pain:   -     Dental:    -     Endocrine:    -     Gastroenterology:    -     Vascular Medicine:    -     Hematology:    -     Medical Weight Management:   -    "  Physical Therapy/Exercise:    -     Nephrology:    -     Neurology:    -     Pulmonology:    -     Rheumatology:    -     Other    -     Other    -     Other    -           Testing: UGI:    -     EGD:  Needed -     Other:    -        PCP: Establish care with PCP:  Completed -     Follow up with PCP:    -     PCP letter of support:  Needed -        Smoking: Quit tobacco use (3 months smoke free)?:    -     Quit date:    -        Patient Education:  Information Session:  Completed -     Given \"Making your decision\" handout?:  Given \"\"Making your decision\"\" handout? -     Given support group information?:  support group contact information provided -     Attended support group?:    -     Support plan in place?:  Completed -     Research consents signed?:    -        Additional Surgery Requirements: Review Coag plan:    -     HgA1c <8:    -     Inpatient pain consult:    -     Final nicotine screen:    -     Dental work complete:    -     Birth control plan:    -     Other Requirements:    -     Other Requirements:    -        Final Tasks:  Before surgery online class:  Needed -     Before surgery online class website link:  [email or web address]   After surgery online class:  Needed -     After surgery online class website link:  [email or web address]   Nurse visit for weigh-in and information:  Needed -     Pre-assessment clinic visit with anesthesia team for H&P:  Needed -     Final labs (Hgb, plt, T&S, UA):  Needed -        Notes:   -          MEDICATION STARTED AT THIS APPOINTMENT  We are starting topiramate at bedtime.  Start one tab, 25 mg, for a week. Go up to 50 mg (2 tabs) for the next week. At the third week, take   3 tabs (75 mg).  Stay at 3 tabs until you are seen again. Call the nurse at [phone] if you have any questions or concerns. (Do not stop taking it if you don't think it's working. For some people it works even though they do not feel much " different.)    Topiramate (Topamax) is a medication that is used most often to treat migraine headaches or for seizures. It has also been found to help with weight loss. Although it's not currently FDA approved for weight loss, it has been used safely for a number of years to help people who are carrying extra weight.     Just how topiramate helps with weight loss has not been exactly determined. However it seems to work on areas of the brain to quiet down signals related to eating.      Topiramate may make you:    >feel less interest in eating in between meals   >think less about food and eating   >find it easier to push the plate away   >find giving up pop easier    >have an easier time eating less    For some of our patients, the pills work right away. They feel and think quite differently about food. Other patients don't feel much of a change but find in fact they have lost weight! Like all weight loss medications, topiramate works best when you help it work.  This means:    1) Have less tempting high calorie (fattening) food around the house or office    2) Have lower calorie food (fruits, vegetables,low fat meats and dairy) for snacks    3) Eat out only one time or less each week.   4) Eat your meals at a table with the TV or computer off.    Side-effects. Topiramate is generally well tolerated. The main side-effects we see are:   Tingling in hands,feet, or face (usually not very troublesome)   Mental confusion and word finding trouble (about 10% of patients have this.)     Feeling sleepy or a bit dopey- this goes away very soon after starting.    One of the dangers of topiramate is the possibility of birth defects--if you get pregnant when you are on it, there is the risk that your baby will be born with a cleft lip or palate.  If you are on topiramate and of child bearing age, you need to be on a reliable form of birth control or refrain from sexual intercourse.     Please refer to the pharmacy insert for  more information on side-effects. Since many pharmacists are not familiar with the use of topiramate in weight loss, calling the clinic will get you the most accurate information on the use of this medication for weight loss.     In order to get refills of this or any medication we prescribe you must be seen in the medical weight mgmt clinic every 2-3 months. Please have your pharmacy fax a refill request to [phone].

## 2020-08-11 NOTE — LETTER
"2020       RE: Grecia MORALEZ  [address]  [place] [address]     Dear Colleague,    Thank you for referring your patient, Grecia MORALEZ, to the [facility] SURGICAL WEIGHT MANAGEMENT at [facility]. Please see a copy of my visit note below.    Grecia MORALEZ is a 35 year old female who is being evaluated via a billable video visit.          Video-Visit Details    Type of service:  Video Visit    Video Start Time: 802  Video End Time: 832    Originating Location (pt. Location): Home    Distant Location (provider location):  [facility] SURGICAL WE[facility] MANAGEMENT     Platform used for Video Visit: Kartik Caballero NP          New Bariatric Surgery Consultation Note    2020    RE: Grecia MORALEZ  MR#: [id number]  : 1985      Referring provider: No flowsheet data found.    Chief Complaint/Reason for visit: evaluation for possible weight loss surgery    Dear Kayla Arcos (General),    I had the pleasure of seeing your patient, Grecia MORALEZ, to evaluate her obesity and consider her for possible weight loss surgery. As you know, Grecia MORALEZ is 35 year old.  She has a height of 5' 7\", a weight of 410 lbs 0 oz, and calculated Body mass index is 64.22 kg/m .      HISTORY OF PRESENT ILLNESS:  Weight Loss History Reviewed with Patient 2020   How long have you been overweight? From Middle age and beyond   What is the most that you have ever weighed? 420   What is the most weight you have lost? 0   I have tried the following methods to lose weight Watching portions or calories, Exercise, Slimfast   I have tried the following weight loss medications? (Check all that apply) None   Have you ever had weight loss surgery? No     Has always struggled with weight- gradual over time   More weight gain with each child (3boys)   Has never been able to lose weight with diet/ exercise     Has been considering surgery for a while. " Several friends have had surgery     CO-MORBIDITIES OF OBESITY INCLUDE:     2020   I have the following health issues associated with obesity: None of the above   I have the following symptoms associated with obesity: Lower Extremity Swelling, Fatigue     GERD: well controlled omeprazole once daily - 5-10 year history, worse with specific foods and carbonation     Sleep: Snores, no difficulty staying asleep, wakes rested, doesn't stop breathing at night    No history blood clots     PAST MEDICAL HISTORY:  No past medical history on file.    PAST SURGICAL HISTORY:  Past Surgical History:   Procedure Laterality Date     AS REMOVAL OF OVARIAN CYST(S)        SECTION      x2       FAMILY HISTORY:   No family history on file.    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 Yes   Who do you have in your support network that can be available to help you for the first 2 weeks after surgery? Yes.   Who can you count on for support throughout your weight loss surgery journey? Yes.   Can you afford 3 meals a day?  Yes   Can you afford 50-60 dollars a month for vitamins? Yes       HABITS:     2020   How often do you drink alcohol? Never   Have you ever used any of the following nicotine products? No   Have you or are you currently using street drugs or prescription strength medication for which you do not have a prescription for? No   Do you have a history of chemical dependency (alcohol or drug abuse)? No       PSYCHOLOGICAL HISTORY:   Psychological History Reviewed With Patient 2020   Have you ever attempted suicide? Never.   Have you had thoughts of suicide in the past year? No   Have you ever been hospitalized for mental illness or a suicide  attempt? Never.   Do you have a history of chronic pain? No   Have you ever been diagnosed with fibromyalgia? No   Are you currently seeing a therapist or counselor?  No   Are you currently seeing a psychiatrist? No       ROS:     8/6/2020   Skin:  None of the above   HEENT: None of these   Musculoskeletal: None of the above   Cardiovascular: Shortness of breath with activity   Pulmonary: Shortness of breath with activity, Snoring   Gastrointestinal: Reflux   Genitourinary: None of the above   Hematological: None of the above   Neurological: None of the above   Female only: None of the above       EATING BEHAVIORS:     8/6/2020   Have you or anyone else thought that you had an eating disorder? Yes   If you answered yes to the previous eating disorder question, select the types that apply from this list: Binge Eating   Do you currently binge eat (eat a large amount of food in a short time)? Yes   Are you an emotional eater? Yes   Do you get up to eat after falling asleep? No     Doesn't make time to make healthy decisions  Grabbing and going- busy lifestyle   emotional eating  Snacking throughout the day - doesn't eat true meal until dinner   Done eating at dinner time   Lately has been feeling fatigued after latest meal of the day - blah   Important to eat what ever is in front of her   If eats breakfast- very hungry all day       EXERCISE:     8/6/2020   How often do you exercise? 3 to 4 times per week   What is the duration of your exercise (in minutes)? 30 Minutes   What types of exercise do you do? walking   What keeps you from being more active?  Pain, Shortness of breath, Worried people will look at me       MEDICATIONS:  Current Outpatient Medications   Medication Sig Dispense Refill     omeprazole (PRILOSEC) 20 MG DR capsule Take 20 mg by mouth as needed       Has taken topamax with benefit at 25 mg     ALLERGIES:  Allergies   Allergen Reactions     Latex Rash       LABS/IMAGING/MEDICAL RECORDS REVIEW:  "    PHYSICAL EXAM:  Ht 1.702 m (5' 7\")   Wt (!) 186 kg (410 lb)   BMI 64.22 kg/m        In summary, Grecia MORALEZ has Class III obesity with a body mass index of Body mass index is 64.22 kg/m . kg/m2 and the comorbidities stated above. She completed an informational seminar and is a candidate for the laparoscopic gastric sleeve.  She will have to complete the following pre-requisites:    Received weight loss goal of 41 lb prior to surgery. 369  3 dietitian visits- recommend monthly until surgery   Have preoperative laboratory tests drawn.  Psychological Evaluation with MMPI and clearance for weight loss surgery.  Letter of clearance from the following EGD, primary care (address blood pressure)   Starting topamax- taper to 75mg, has tolerated 25 mg in the past. No history of kidney stones or glaucoma  GLP1- consider in future if DM  Labs need to be sent to [facility]     Is patient currently taking narcotic/opioid medications? No    Today in the office we discussed gastric sleeve surgery. Preoperative, perioperative, and postoperative processes, management, and follow up were addressed.  Risks and benefits were outlined including the risk of death, staple line leak (1-2%), PE, DVT, ulcer, worsening GERD, N/V, stricture, hernia, wound infection, weight regain, and vitamin deficiencies. I emphasized exercise and activity along with appropriate food choice as the main foundation for weight loss with surgery providing surgical reinforcement of this.  All questions were answered.  A goal sheet and support group handout were given to the patient.    Once the patient has completed the requirements in their task list and there are no further recommendations, the pt will be allowed to see the surgeon of her choice for consultation on the laparoscopic gastric sleeve surgery. Patient verbalizes understanding of the process to surgery and expectations for the postoperative period including the need for lifelong lifestyle " changes, vitamin supplementation, and laboratory monitoring.    PCP is Kayla Arcos 9/2019- recommendation letter written, prior to bariatric consult. will need updated   Sincerely,     Cierra Caballero NP    I spent a total of 30 minutes face to face with the patient during today's office visit. Over 50% of this time was spent counseling the patient and/or coordinating care.      Bariatric Task List  Status:  Is patient a candidate for bariatric surgery?:  patient is a candidate for bariatric surgery -     Cleared to schedule surgeon consult?:    -     Status:    -     Surgeon: Dr. Gomez  -     Tentative surgery month/year: November 2020 -        Insurance: Insurance:  [place]  -     [employer]: PCP Recommendation and Medical Clearance:    -     HP Referral:    -     Other:    -        Patient Info: Initial Weight:  410 -     Date of Initial Weight/Height:  8/11/2020 -     Goal Weight (lbs):  369 -     Required Weight Loss:  41 -     Surgery Type:  sleeve gastrectomy -     Multidisciplinary Meeting:    -        Dietician Visits: Structured weight loss required by insurance?:  no structured weight loss required -     Dietician Visit 1:  Needed -     Dietician Visit 2:  Needed -     Dietician Visit 3:  Needed -     Dietician Visit 4:    -     Dietician Visit 5:    -     Dietician Visit 6:    -     Dietician Visit additional:    -     Clearance from dietician to see surgeon?:    -     Dietician Notes:    -        Psychological Evaluation: Psych eval:  Needed -     Therapist letter of support:    -     Psychiatrist letter of support:    -     Establish care with therapist:    -     Complete eating disorder evaluation:    -     Letter of clearance from therapist/eating disorder program:    -     Other:    -        Lab Work: Complete Blood Count:  Needed -     Comprehensive Metabolic Panel:  Needed -     Vitamin D:  Needed -     Hgb A1c:  Needed -     PTH:  Needed -     H. pylori:    -     TSH:  Needed -     Nicotine  "Testing:    -     Other:    -        Consults/ Clearance: Sleep Medicine:    -     Cardiac:    -     Pain:   -     Dental:    -     Endocrine:    -     Gastroenterology:    -     Vascular Medicine:    -     Hematology:    -     Medical Weight Management:   -     Physical Therapy/Exercise:    -     Nephrology:    -     Neurology:    -     Pulmonology:    -     Rheumatology:    -     Other    -     Other    -     Other    -           Testing: UGI:    -     EGD:  Needed -     Other:    -        PCP: Establish care with PCP:  Completed -     Follow up with PCP:    -     PCP letter of support:  Needed -        Smoking: Quit tobacco use (3 months smoke free)?:    -     Quit date:    -        Patient Education:  Information Session:  Completed -     Given \"Making your decision\" handout?:  Given \"\"Making your decision\"\" handout? -     Given support group information?:  support group contact information provided -     Attended support group?:    -     Support plan in place?:  Completed -     Research consents signed?:    -        Additional Surgery Requirements: Review Coag plan:    -     HgA1c <8:    -     Inpatient pain consult:    -     Final nicotine screen:    -     Dental work complete:    -     Birth control plan:    -     Other Requirements:    -     Other Requirements:    -        Final Tasks:  Before surgery online class:  Needed -     Before surgery online class website link:  [email or web address]   After surgery online class:  Needed -     After surgery online class website link:  [email or web address]   Nurse visit for weigh-in and information:  Needed -     Pre-assessment clinic visit with anesthesia team for H&P:  Needed -     Final labs (Hgb, plt, T&S, UA):  Needed -        Notes:   -             Again, thank you for allowing me to participate in the care of your patient.      Sincerely,    Cierra Caballero NP      "

## 2020-10-15 ENCOUNTER — TRANSFERRED RECORDS (OUTPATIENT)
Dept: HEALTH INFORMATION MANAGEMENT | Facility: CLINIC | Age: 35
End: 2020-10-15

## 2020-10-19 ENCOUNTER — MEDICAL CORRESPONDENCE (OUTPATIENT)
Dept: HEALTH INFORMATION MANAGEMENT | Facility: CLINIC | Age: 35
End: 2020-10-19

## 2020-10-19 ENCOUNTER — TELEPHONE (OUTPATIENT)
Dept: GASTROENTEROLOGY | Facility: CLINIC | Age: 35
End: 2020-10-19

## 2020-11-02 ENCOUNTER — VIRTUAL VISIT (OUTPATIENT)
Dept: ENDOCRINOLOGY | Facility: CLINIC | Age: 35
End: 2020-11-02
Payer: COMMERCIAL

## 2020-11-02 VITALS — HEIGHT: 67 IN | BODY MASS INDEX: 65.18 KG/M2

## 2020-11-02 DIAGNOSIS — E88.810 METABOLIC SYNDROME: ICD-10-CM

## 2020-11-02 DIAGNOSIS — R73.03 PRE-DIABETES: Primary | ICD-10-CM

## 2020-11-02 DIAGNOSIS — Z71.3 NUTRITIONAL COUNSELING: ICD-10-CM

## 2020-11-02 DIAGNOSIS — E66.813 CLASS 3 SEVERE OBESITY DUE TO EXCESS CALORIES WITH SERIOUS COMORBIDITY AND BODY MASS INDEX (BMI) OF 60.0 TO 69.9 IN ADULT (H): ICD-10-CM

## 2020-11-02 DIAGNOSIS — E66.01 CLASS 3 SEVERE OBESITY DUE TO EXCESS CALORIES WITH SERIOUS COMORBIDITY AND BODY MASS INDEX (BMI) OF 60.0 TO 69.9 IN ADULT (H): ICD-10-CM

## 2020-11-02 DIAGNOSIS — E66.01 MORBID OBESITY (H): Primary | ICD-10-CM

## 2020-11-02 PROCEDURE — 99443 PR PHYSICIAN TELEPHONE EVALUATION 21-30 MIN: CPT | Performed by: NURSE PRACTITIONER

## 2020-11-02 PROCEDURE — 97803 MED NUTRITION INDIV SUBSEQ: CPT | Performed by: DIETITIAN, REGISTERED

## 2020-11-02 RX ORDER — ALBUTEROL SULFATE 90 UG/1
AEROSOL, METERED RESPIRATORY (INHALATION)
COMMUNITY
Start: 2019-12-13

## 2020-11-02 RX ORDER — ALPRAZOLAM 0.25 MG
0.25 TABLET ORAL
COMMUNITY
Start: 2020-09-22

## 2020-11-02 RX ORDER — BUPROPION HYDROCHLORIDE 150 MG/1
TABLET, EXTENDED RELEASE ORAL
COMMUNITY
Start: 2019-12-13

## 2020-11-02 ASSESSMENT — PAIN SCALES - GENERAL: PAINLEVEL: NO PAIN (0)

## 2020-11-02 NOTE — PROGRESS NOTES
Pre-Bariatric Surgery Note    Kayla Arcos    Date: 2020     RE: Grecia MORALEZ    MR#: [id number]   : 1985   Date of Visit: 2020    REASON FOR VISIT: Preoperative evaluation for possible weight loss surgery    Dear Kayla Abbott,    I had the pleasure of seeing your patient, Grecia MORALEZ, in my preoperative bariatric clinic.    As you know, she is morbidly obese and considering weight loss surgery to treat obesity in association with her medical conditions of obesity.  Her consult weight was 410.  She has lost 0 pounds since her consult weight. She has not met her required pre-surgery weight (369).    Had several family emergencies since last seen which have required her to do some care giving. Now, both grandparents on mom's side are in ICU for covid.     Was doing well for 3 weeks after our initial visits. Struggling to get back on track. Watching eating, decreasing soda. Now, struggling with meal prep (was really helpful). Was doing shakes every morning but had to stop while the chaos.     Topiramate- taper to 75mg at bedtime. Took for the first week at 25mg. Was noticing some uncomfortable side effects- felt almost like panic attacks (tingling in extremities, headaches, dizziness). Has stopped the medication.     Meal planning - improving, helps feel in control     Hunger a lot of the day especially if she doesn't get meal planning done. Trying to make healthier options. Less difficult in the evenings.     Struggling with snacking options. Not always hungry at time of snack. Not feeling satisfied. Struggles the most mid morning.     Has cut out soda     Psych consult  EGD   Labs in scans and care everywhere from 10/15/2020. Vit D low (28), Alk/PHos high, TSH normal, A1C 6.1 (no hx DMII)     Most recent weights:  Wt Readings from Last 4 Encounters:   20 (!) 186 kg (410 lb)       Please refer to initial consult note from date 2020 for patient's weight history and  "co-morbidities.    ROS    No past medical history on file.    Past Surgical History:   Procedure Laterality Date     AS REMOVAL OF OVARIAN CYST(S)        SECTION      x2       Current Outpatient Medications   Medication     ALPRAZolam (XANAX) 0.25 MG tablet     buPROPion (WELLBUTRIN SR) 150 MG 12 hr tablet     omeprazole (PRILOSEC) 20 MG DR capsule     PROAIR  (90 Base) MCG/ACT inhaler     topiramate (TOPAMAX) 25 MG tablet     No current facility-administered medications for this visit.        Allergies   Allergen Reactions     Latex Rash       PHYSICAL EXAMINATION:  Ht 1.689 m (5' 6.5\")   BMI 65.18 kg/m     Body mass index is 65.18 kg/m .   NAD NCAT  Respiratory: breathing unlabored  Abdomen: obese, soft/nt/nd    Special testing: EGD, psych consult    I reviewed the choice of surgery and she would like to undergo laparoscopic sleeve gastrectomy surgery.    I reviewed the risks of surgery related to the procedure.    Sleeve Gastrectomy: Risks and Side Effects    The complications or risks of surgery include but are not limited to: death, heart attack, infection in the surgical site (wound infection), abdomen (abscess), bladder (urinary tract infection), lungs (pneumonia), clots in legs (deep vein thrombosis) or lungs (pulmonary emboli),  injury to the bowels or other organs, bowel obstruction, hernia at the incision and gastrointestional bleeding.    More specific risks related to vertical sleeve gastrectomy were detailed at the bariatric informational seminar and include the following: leak at the vertical sleeve staple line, leak at the anastomoses,  nausea, vomiting, and dehydration for several months,  adhesions causing bowel obstruction, rapid weight loss causing a higher rate of gallstone formation during the first 6 months after surgery, decreased absorption of vitamins and protein because of the reduced stomach size, weight regain if inappropriate food intake occurs, stricture, injury to " other organs, hernia,  and ulcers.       Side effects of bariatric surgery include but are not limited to: abdominal pain, cramping, bloating, constipation, nausea, vomiting, diarrhea, difficulty swallowing,  dehydration, hair loss, excess skin, protein, iron and vitamin deficiencies, heartburn, transfer of addictions, increased anxiety and worsening depression.       I emphasized exercise and activity behavior along with appropriate food choice as the main foundation for weight loss with surgery providing surgical reinforcement of the appropriate behavior set.    PLAN:  Continue off topiramate- increased anxiety with tingling and headache/ dizziness    Start ozempic (pre-diabetes, metabolic syndrome), if not covered try saxenda     Discussed protein shakes when having busy stressful days as a meal replacement/ or snack to prevent excessive hunger     Discussed restarting things slowly- just one thing at a time    Keep working on finding time to meal prep         Updated task list given to patient:        Review of general surgery weight loss process    1. Complete preoperative requirements, including weight loss.  Final weight check to confirm MANDATORY weight loss requirement must be documented on a clinic scale.    2. Discuss prior authorization with .    3. History and physical evaluation by PCP of PAC clinic within 30 days of surgery date, preoperative class, and weight check (weigh-in visit) to be scheduled by patient.  Pre-anesthesia clinic for risk evaluation to be scheduled by anesthesia clinic.    4. We cannot guarantee that patient will qualify for surgery unless all preoperative requirements are met, prior authorization from primary insurance company is granted, and insurance changes do not occur.    5. It is possible for patients to regain all weight after weight loss surgery unless they follow guidelines prescribed by our bariatric center.    6. All patients with gastrointestinal  complaints after weight loss surgery must have complaints conveyed to the bariatric team for appropriate treatment.    7. Vitamin deficiencies may develop post-bariatric surgery and annual laboratory testing should be performed.    8. Persistent nausea/vomiting after bariatric surgery entails risk of thiamine deficiency and should be treated early.  Vitamin B12 deficiency may develop, especially after gastric bypass surgery and must be recognized.        If you have any questions about our plans please don't hesitate to contact me.    Sincerely,    Cierra Caballero NP    I spent a total of 32 minutes face to face with Grecia MORALEZ during today's office visit.  Over 50% of this time was spent counseling the patient and/or coordinating care.

## 2020-11-02 NOTE — PROGRESS NOTES
"Grecia MORALEZ is a 35 year old female who is being evaluated via a billable telephone visit.      The patient has been notified of following:     \"This telephone visit will be conducted via a call between you and your physician/provider. We have found that certain health care needs can be provided without the need for a physical exam.  This service lets us provide the care you need with a short phone conversation.  If a prescription is necessary we can send it directly to your pharmacy.  If lab work is needed we can place an order for that and you can then stop by our lab to have the test done at a later time.    Telephone visits are billed at different rates depending on your insurance coverage. During this emergency period, for some insurers they may be billed the same as an in-person visit.  Please reach out to your insurance provider with any questions.    If during the course of the call the physician/provider feels a telephone visit is not appropriate, you will not be charged for this service.\"    Patient has given verbal consent for Telephone visit?  Yes    What phone number would you like to be contacted at? [phone]    How would you [facility]    Phone call duration: 32 minutes    Cierra Caballero NP  During this virtual visit the patient is located in [place], patient verifies this as the location during the entirety of this visit.   "

## 2020-11-02 NOTE — LETTER
"11/2/2020       RE: Grecia MORALEZ  [address]  [place] [address]     Dear Colleague,    Thank you for referring your patient, Grecia MORALEZ, to the [facility] [place] at [facility]. Please see a copy of my visit note below.    Grecia MORALEZ is a 35 year old female who is being evaluated via a billable telephone visit.      The patient has been notified of following:     \"This telephone visit will be conducted via a call between you and your physician/provider. We have found that certain health care needs can be provided without the need for a physical exam.  This service lets us provide the care you need with a short phone conversation.  If a prescription is necessary we can send it directly to your pharmacy.  If lab work is needed we can place an order for that and you can then stop by our lab to have the test done at a later time.    Telephone visits are billed at different rates depending on your insurance coverage. During this emergency period, for some insurers they may be billed the same as an in-person visit.  Please reach out to your insurance provider with any questions.    If during the course of the call the physician/provider feels a telephone visit is not appropriate, you will not be charged for this service.\"    Patient has given verbal consent for Telephone visit?  Yes    What phone number would you like to be contacted at? [phone]    How would you like to obtain your AVS? My[employer]    Phone call duration: 30 minutes    Phone time    Start Time: 12:40 PM   End Time: 1:10 PM      Return Bariatric Nutrition Consultation Note    Reason For Visit: Nutrition Assessment    Grecia MORALEZ is a 35 year old presenting today for follow-up bariatric nutrition consult.   Pt is interested in laparoscopic sleeve gastrectomy with Dr. Gomez expected surgery in Nov 2020.  Patient is accompanied by self.  This " "is pt's second of 3 required nutrition visits prior to surgery.     Pt referred by Cierra Caballero NP on August 11, 2020.  Patient with Co-morbidities of obesity including:  Type II DM no  Renal Failure no  Sleep apnea no  Hypertension no   Dyslipidemia no  Joint pain no  Back pain no  GERD yes     Support System Reviewed With Patient 8/6/2020   Who do you have in your support network that can be available to help you for the first 2 weeks after surgery? Yes.   Who can you count on for support throughout your weight loss surgery journey? Yes.       ANTHROPOMETRICS:  Estimated body mass index is 65.18 kg/m  as calculated from the following:    Height as of an earlier encounter on 11/2/20: 1.689 m (5' 6.5\").    Weight as of 8/11/20: 186 kg (410 lb).     Required weight loss goal pre-op: 41 lbs from initial consult weight (goal weight 369 lbs or less before surgery)       8/6/2020   I have tried the following methods to lose weight Watching portions or calories, Exercise, Slimfast       Weight Loss Questions Reviewed With Patient 8/6/2020   How long have you been overweight? 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   At home she has 3 kids and a , and feel like she has no time for herself.     She has previously lost weight with meal prepping but it is not always sustainable. She grabs and goes during the work day, some day she does not eat until diner time. Will snack before and after dinner.      Today: 11/2/2020: Starting over, had many family emergencies over the past month and stop meal prepping for lunches. She also stopped having a shake in the morning for breakfast.   She would like to go for a walk everyday for exercise and reports it helps with her stress and anxiety.     She has stopped drinking pop: drinks vitamin water zero and " "water.     Progress Towards Previous Goals:  GOALS:  Relating To Eatin. Eat slowly (20-30 minutes per meal), chewing foods well (25 chews per bite/applesauce consistency)-No Change  2. 9\" Plate method (1/2 plate non-starchy vegetables/fruit, 1/4 plate lean protein, 1/4 plate whole grain starch - no more than 1/2 cup carb/meal)-No Change   3. Eat 3 meals per day-No Change   - Breakfast try a protein shake as discussed     *Protein Shake Criteria: no more than 210 Calories, at least 20 grams of protein, and less than 10 grams of sugar     Meal Replacement Shake Options:   [facility] smoothie (160 Calories, 20 g protein)   Premier Protein (160 Calories, 30 g protein)  Slim Fast Advanced Nutrition (180 Calories, 20 g protein)  Muscle Milk, lactose-free, 17 oz bottle (210 Calories, 30 g protein)  Integrated Supplements, no artificial sugars (110 Calories, 20 g protein)  Genepro, unflavored protein powder (60 Calories, 30 g protein)    Meal Replacement Bar Options:  [facility] Protein Shake (160 Calories, 15 g protein)  Quest Protein Bars (190 Calories, 20 g protein)  Built Bar (170 Calories, 15-20 g protein)  One Protein Bar (210 calories, 20 g protein)  [place] Signature Protein Bar ([employer]) (190 Calories, 21 g protein)  Pure Protein Bars (180 Calories, 21 g protein)    You may purchase meal replacement products online at our e-store. Visit e-store at [email or web address]   - Recommend the [facility] protein shakes (based powder + flavor packet), if interested Recommend trying the variety box to see flavor preferences.  - For recipes and ideas on how to use products, visit - [employer]    4. Meals prep on  for lunches: -Continues   - Purchase and portion out to go items:  -- apples, banana, oranges, portion out grapes or berries  -- Vegetables and dip (have vegetables clean and cut and dip portioned out  -- Single serving Ghanaian or Georgian (sykr) yogurt or cottage cheese   -- Sting cheese   -- " 5-6 crackers and 2-3 slices of cheese  -- deli meat and a piece of string cheese   -- hard boiled eggs or egg salad with vegetables and 5-6 crackers     Relating to beverages:  Reduce caffeine/carbonation/calorie containing beverages-Met, no more pop   Separate fluids from meals by 30 minutes before, during, and after eating-No change     Relating to dietary supplements:  Start a multivitamin containing iron daily-Did not discuss today     Eating Habits 8/6/2020   Do you have any dietary restrictions? No   Do you currently binge eat (eat a large amount of food in a short time)? Yes   Are you an emotional eater? Yes   Do you get up to eat after falling asleep? No   What foods do you crave? Junk.       ADDITIONAL INFORMATION:    Dining Out History Reviewed With Patient 8/6/2020   How often do you dine out? Rarely.   Where do you dine out? (select all that apply) take out   What types of food do you order when you dine out? Chicken Sand's, fries       Physical Activity Reviewed With Patient 8/6/2020   How often do you exercise? 3 to 4 times per week   What is the duration of your exercise (in minutes)? 30 Minutes   What types of exercise do you do? walking   What keeps you from being more active?  Pain, Shortness of breath, Worried people will look at me       NUTRITION DIAGNOSIS:  Obesity r/t long history of self-monitoring deficit and excessive energy intake aeb BMI >30 kg/m2.    INTERVENTION:  Intervention Provided/Education:   1. Reviewed and modified previous goals, discussed focusing on 1-3 goals at a time instead of all the goals at once.   2. Provided encouragement and support.   3. Reviewed post-op diet guidelines, GI anatomy of bariatric surgeries, ways to help prepare for post-op diet guidelines pre-operatively, portion/calorie-control, mindful eating and sources of protein.   4. Brainstormed lunch ideas with patient.   5. AVS via [employer]     Questions Reviewed With Patient 8/6/2020   How ready are you to  "make changes regarding your weight? Number 1 = Not ready at all to make changes up to 10 = very ready. 10   How confident are you that you can change? 1 = Not confident that you will be successful making changes up to 10 = very confident. 10       Patient Understanding: good  Expected Compliance: good    GOALS:  Relating To Eatin. Eat slowly (20-30 minutes per meal), chewing foods well (25 chews per bite/applesauce consistency)  2. 9\" Plate method (1/2 plate non-starchy vegetables/fruit, 1/4 plate lean protein, 1/4 plate whole grain starch - no more than 1/2 cup carb/meal)  3. Eat 3 meals per day  - Breakfast try a protein shake as discussed     *Protein Shake Criteria: no more than 210 Calories, at least 20 grams of protein, and less than 10 grams of sugar     4. Meals prep on  for lunches:   - Try a 1/2 sandwich or wrap and pair with 1-2 of the item below:  -- apples, banana, oranges, portion out grapes or berries  -- Vegetables and dip (have vegetables clean and cut and dip portioned out  -- Single serving Nepali or German (sykr) yogurt or cottage cheese   -- Sting cheese   -- 5-6 crackers and 2-3 slices of cheese  -- deli meat and a piece of string cheese   -- hard boiled eggs or egg salad with vegetables and 5-6 crackers  -Try a fajita salad: Chicken with onion and pepper and cheese on top of a bed of lettuce. Use salsa of the dressing.   - Try a buffalo chicken salad: shredded chicken with buffalo sauce, shedded carrots and chopped celery use ranch or blue cheese for the dressing (2 TBSP or less of dressing)   - Try lettuce wraps instead of a tortilla OR lunch meat roll ups pair with other items  - Try egg/tuna/chicken salad with 5-6 cracker and vegetables to dip it in.      Relating to beverages:  Reduce caffeine/carbonation/calorie containing beverages  Separate fluids from meals by 30 minutes before, during, and after eating    Relating to dietary supplements:  Start a multivitamin containing " iron daily     Walk/exercise for 15-30 minutes daily    Initial Consult / Weight Loss    My Plate Planner_English - Pt Education  [email or web address]    Protein Sources for Weight Loss  [email or web address]       Time spent with patient: 30 minutes.  Halie Rosado, MS, RD, LD

## 2020-11-02 NOTE — NURSING NOTE
"Chief Complaint   Patient presents with     RECHECK     Follow up appointment.       Vitals:    11/02/20 1129   Height: 1.689 m (5' 6.5\")       Body mass index is 65.18 kg/m .                            MIGUEL SHI, EMT    "

## 2020-11-02 NOTE — LETTER
2020       RE: Grecia MORALEZ  [address]  [place] [address]     Dear Colleague,    Thank you for referring your patient, Grecia MORALEZ, to the [facility] [place] at [facility]. Please see a copy of my visit note below.        Pre-Bariatric Surgery Note    Kayla Arcos    Date: 2020     RE: Grecia MORALEZ    MR#: [id number]   : 1985   Date of Visit: 2020    REASON FOR VISIT: Preoperative evaluation for possible weight loss surgery    Dear Kayla Abbott,    I had the pleasure of seeing your patient, Grecia MORALEZ, in my preoperative bariatric clinic.    As you know, she is morbidly obese and considering weight loss surgery to treat obesity in association with her medical conditions of obesity.  Her consult weight was 410.  She has lost 0 pounds since her consult weight. She has not met her required pre-surgery weight (369).    Had several family emergencies since last seen which have required her to do some care giving. Now, both grandparents on mom's side are in ICU for covid.     Was doing well for 3 weeks after our initial visits. Struggling to get back on track. Watching eating, decreasing soda. Now, struggling with meal prep (was really helpful). Was doing shakes every morning but had to stop while the chaos.     Topiramate- taper to 75mg at bedtime. Took for the first week at 25mg. Was noticing some uncomfortable side effects- felt almost like panic attacks (tingling in extremities, headaches, dizziness). Has stopped the medication.     Meal planning - improving, helps feel in control     Hunger a lot of the day especially if she doesn't get meal planning done. Trying to make healthier options. Less difficult in the evenings.     Struggling with snacking options. Not always hungry at time of snack. Not feeling satisfied. Struggles the most mid morning.     Has cut out soda  "    Psych consult  EGD   Labs in scans and care everywhere from 10/15/2020. Vit D low (28), Alk/PHos high, TSH normal, A1C 6.1 (no hx DMII)     Most recent weights:  Wt Readings from Last 4 Encounters:   20 (!) 186 kg (410 lb)       Please refer to initial consult note from date 2020 for patient's weight history and co-morbidities.    ROS    No past medical history on file.    Past Surgical History:   Procedure Laterality Date     AS REMOVAL OF OVARIAN CYST(S)        SECTION      x2       Current Outpatient Medications   Medication     ALPRAZolam (XANAX) 0.25 MG tablet     buPROPion (WELLBUTRIN SR) 150 MG 12 hr tablet     omeprazole (PRILOSEC) 20 MG DR capsule     PROAIR  (90 Base) MCG/ACT inhaler     topiramate (TOPAMAX) 25 MG tablet     No current facility-administered medications for this visit.        Allergies   Allergen Reactions     Latex Rash       PHYSICAL EXAMINATION:  Ht 1.689 m (5' 6.5\")   BMI 65.18 kg/m     Body mass index is 65.18 kg/m .   NAD NCAT  Respiratory: breathing unlabored  Abdomen: obese, soft/nt/nd    Special testing: EGD, psych consult    I reviewed the choice of surgery and she would like to undergo laparoscopic sleeve gastrectomy surgery.    I reviewed the risks of surgery related to the procedure.    Sleeve Gastrectomy: Risks and Side Effects    The complications or risks of surgery include but are not limited to: death, heart attack, infection in the surgical site (wound infection), abdomen (abscess), bladder (urinary tract infection), lungs (pneumonia), clots in legs (deep vein thrombosis) or lungs (pulmonary emboli),  injury to the bowels or other organs, bowel obstruction, hernia at the incision and gastrointestional bleeding.    More specific risks related to vertical sleeve gastrectomy were detailed at the bariatric informational seminar and include the following: leak at the vertical sleeve staple line, leak at the anastomoses,  nausea, vomiting, and " dehydration for several months,  adhesions causing bowel obstruction, rapid weight loss causing a higher rate of gallstone formation during the first 6 months after surgery, decreased absorption of vitamins and protein because of the reduced stomach size, weight regain if inappropriate food intake occurs, stricture, injury to other organs, hernia,  and ulcers.       Side effects of bariatric surgery include but are not limited to: abdominal pain, cramping, bloating, constipation, nausea, vomiting, diarrhea, difficulty swallowing,  dehydration, hair loss, excess skin, protein, iron and vitamin deficiencies, heartburn, transfer of addictions, increased anxiety and worsening depression.       I emphasized exercise and activity behavior along with appropriate food choice as the main foundation for weight loss with surgery providing surgical reinforcement of the appropriate behavior set.    PLAN:  Continue off topiramate- increased anxiety with tingling and headache/ dizziness    Start ozempic (pre-diabetes, metabolic syndrome), if not covered try saxenda     Discussed protein shakes when having busy stressful days as a meal replacement/ or snack to prevent excessive hunger     Discussed restarting things slowly- just one thing at a time    Keep working on finding time to meal prep         Updated task list given to patient:        Review of general surgery weight loss process    1. Complete preoperative requirements, including weight loss.  Final weight check to confirm MANDATORY weight loss requirement must be documented on a clinic scale.    2. Discuss prior authorization with .    3. History and physical evaluation by PCP of PAC clinic within 30 days of surgery date, preoperative class, and weight check (weigh-in visit) to be scheduled by patient.  Pre-anesthesia clinic for risk evaluation to be scheduled by anesthesia clinic.    4. We cannot guarantee that patient will qualify for surgery unless  "all preoperative requirements are met, prior authorization from primary insurance company is granted, and insurance changes do not occur.    5. It is possible for patients to regain all weight after weight loss surgery unless they follow guidelines prescribed by our bariatric center.    6. All patients with gastrointestinal complaints after weight loss surgery must have complaints conveyed to the bariatric team for appropriate treatment.    7. Vitamin deficiencies may develop post-bariatric surgery and annual laboratory testing should be performed.    8. Persistent nausea/vomiting after bariatric surgery entails risk of thiamine deficiency and should be treated early.  Vitamin B12 deficiency may develop, especially after gastric bypass surgery and must be recognized.        If you have any questions about our plans please don't hesitate to contact me.    Sincerely,    Cierra Caballero NP    I spent a total of 32 minutes face to face with Grecia MORALEZ during today's office visit.  Over 50% of this time was spent counseling the patient and/or coordinating care.        Grecia MORALEZ is a 35 year old female who is being evaluated via a billable telephone visit.      The patient has been notified of following:     \"This telephone visit will be conducted via a call between you and your physician/provider. We have found that certain health care needs can be provided without the need for a physical exam.  This service lets us provide the care you need with a short phone conversation.  If a prescription is necessary we can send it directly to your pharmacy.  If lab work is needed we can place an order for that and you can then stop by our lab to have the test done at a later time.    Telephone visits are billed at different rates depending on your insurance coverage. During this emergency period, for some insurers they may be billed the same as an in-person visit.  Please reach out to your insurance provider with any " "questions.    If during the course of the call the physician/provider feels a telephone visit is not appropriate, you will not be charged for this service.\"    Patient has given verbal consent for Telephone visit?  Yes    What phone number would you like to be contacted at? [phone]    How would you like to obtain your AVS? Sera    Phone call duration: 32 minutes    Cierra Caballero NP  During this virtual visit the patient is located in [place], patient verifies this as the location during the entirety of this visit.     "

## 2020-11-02 NOTE — PROGRESS NOTES
"Grecia MORALEZ is a 35 year old female who is being evaluated via a billable telephone visit.      The patient has been notified of following:     \"This telephone visit will be conducted via a call between you and your physician/provider. We have found that certain health care needs can be provided without the need for a physical exam.  This service lets us provide the care you need with a short phone conversation.  If a prescription is necessary we can send it directly to your pharmacy.  If lab work is needed we can place an order for that and you can then stop by our lab to have the test done at a later time.    Telephone visits are billed at different rates depending on your insurance coverage. During this emergency period, for some insurers they may be billed the same as an in-person visit.  Please reach out to your insurance provider with any questions.    If during the course of the call the physician/provider feels a telephone visit is not appropriate, you will not be charged for this service.\"    Patient has given verbal consent for Telephone visit?  Yes    What phone number would you like to be contacted at? [phone]    How would you like to obtain your AVS? [employer]    Phone call duration: 30 minutes    Phone time    Start Time: 12:40 PM   End Time: 1:10 PM      Return Bariatric Nutrition Consultation Note    Reason For Visit: Nutrition Assessment    Grecia MORALEZ is a 35 year old presenting today for follow-up bariatric nutrition consult.   Pt is interested in laparoscopic sleeve gastrectomy with Dr. Gomez expected surgery in Nov 2020.  Patient is accompanied by self.  This is pt's second of 3 required nutrition visits prior to surgery.     Pt referred by Cierra Caballero NP on August 11, 2020.  Patient with Co-morbidities of obesity including:  Type II DM no  Renal Failure no  Sleep apnea no  Hypertension no   Dyslipidemia no  Joint pain no  Back pain no  GERD yes     Support System Reviewed " "With Patient 2020   Who do you have in your support network that can be available to help you for the first 2 weeks after surgery? Yes.   Who can you count on for support throughout your weight loss surgery journey? Yes.       ANTHROPOMETRICS:  Estimated body mass index is 65.18 kg/m  as calculated from the following:    Height as of an earlier encounter on 20: 1.689 m (5' 6.5\").    Weight as of 20: 186 kg (410 lb).     Required weight loss goal pre-op: 41 lbs from initial consult weight (goal weight 369 lbs or less before surgery)       2020   I have tried the following methods to lose weight Watching portions or calories, Exercise, Slimfast       Weight Loss Questions Reviewed With Patient 2020   How long have you been overweight? 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   At home she has 3 kids and a , and feel like she has no time for herself.     She has previously lost weight with meal prepping but it is not always sustainable. She grabs and goes during the work day, some day she does not eat until diner time. Will snack before and after dinner.      Today: 2020: Starting over, had many family emergencies over the past month and stop meal prepping for lunches. She also stopped having a shake in the morning for breakfast.   She would like to go for a walk everyday for exercise and reports it helps with her stress and anxiety.     She has stopped drinking pop: drinks vitamin water zero and water.     Progress Towards Previous Goals:  GOALS:  Relating To Eatin. Eat slowly (20-30 minutes per meal), chewing foods well (25 chews per bite/applesauce consistency)-No Change  2. 9\" Plate method (1/2 plate non-starchy vegetables/fruit, 1/4 plate lean protein, 1/4 plate whole grain starch - no more than 1/2 " cup carb/meal)-No Change   3. Eat 3 meals per day-No Change   - Breakfast try a protein shake as discussed     *Protein Shake Criteria: no more than 210 Calories, at least 20 grams of protein, and less than 10 grams of sugar     Meal Replacement Shake Options:   [facility] smoothie (160 Calories, 20 g protein)   Premier Protein (160 Calories, 30 g protein)  Slim Fast Advanced Nutrition (180 Calories, 20 g protein)  Muscle Milk, lactose-free, 17 oz bottle (210 Calories, 30 g protein)  Integrated Supplements, no artificial sugars (110 Calories, 20 g protein)  Genepro, unflavored protein powder (60 Calories, 30 g protein)    Meal Replacement Bar Options:  [facility] Protein Shake (160 Calories, 15 g protein)  Quest Protein Bars (190 Calories, 20 g protein)  Built Bar (170 Calories, 15-20 g protein)  One Protein Bar (210 calories, 20 g protein)  [facility] Signature Protein Bar ([employer]) (190 Calories, 21 g protein)  Pure Protein Bars (180 Calories, 21 g protein)    You may purchase meal replacement products online at our e-store. Visit e-store at [email or web address]   - Recommend the [facility] protein shakes (based powder + flavor packet), if interested Recommend trying the variety box to see flavor preferences.  - For recipes and ideas on how to use products, visit - [employer]    4. Meals prep on Sunday for lunches: -Continues   - Purchase and portion out to go items:  -- apples, banana, oranges, portion out grapes or berries  -- Vegetables and dip (have vegetables clean and cut and dip portioned out  -- Single serving Croatian or Mongolian (sykr) yogurt or cottage cheese   -- Sting cheese   -- 5-6 crackers and 2-3 slices of cheese  -- deli meat and a piece of string cheese   -- hard boiled eggs or egg salad with vegetables and 5-6 crackers     Relating to beverages:  Reduce caffeine/carbonation/calorie containing beverages-Met, no more pop   Separate fluids from meals by 30 minutes before, during, and  after eating-No change     Relating to dietary supplements:  Start a multivitamin containing iron daily-Did not discuss today     Eating Habits 8/6/2020   Do you have any dietary restrictions? No   Do you currently binge eat (eat a large amount of food in a short time)? Yes   Are you an emotional eater? Yes   Do you get up to eat after falling asleep? No   What foods do you crave? Junk.       ADDITIONAL INFORMATION:    Dining Out History Reviewed With Patient 8/6/2020   How often do you dine out? Rarely.   Where do you dine out? (select all that apply) take out   What types of food do you order when you dine out? Chicken Sand's, fries       Physical Activity Reviewed With Patient 8/6/2020   How often do you exercise? 3 to 4 times per week   What is the duration of your exercise (in minutes)? 30 Minutes   What types of exercise do you do? walking   What keeps you from being more active?  Pain, Shortness of breath, Worried people will look at me       NUTRITION DIAGNOSIS:  Obesity r/t long history of self-monitoring deficit and excessive energy intake aeb BMI >30 kg/m2.    INTERVENTION:  Intervention Provided/Education:   1. Reviewed and modified previous goals, discussed focusing on 1-3 goals at a time instead of all the goals at once.   2. Provided encouragement and support.   3. Reviewed post-op diet guidelines, GI anatomy of bariatric surgeries, ways to help prepare for post-op diet guidelines pre-operatively, portion/calorie-control, mindful eating and sources of protein.   4. Brainstormed lunch ideas with patient.   5. AVS via [employer]     Questions Reviewed With Patient 8/6/2020   How ready are you to make changes regarding your weight? Number 1 = Not ready at all to make changes up to 10 = very ready. 10   How confident are you that you can change? 1 = Not confident that you will be successful making changes up to 10 = very confident. 10       Patient Understanding: good  Expected Compliance:  "good    GOALS:  Relating To Eatin. Eat slowly (20-30 minutes per meal), chewing foods well (25 chews per bite/applesauce consistency)  2. 9\" Plate method (1/2 plate non-starchy vegetables/fruit, 1/4 plate lean protein, 1/4 plate whole grain starch - no more than 1/2 cup carb/meal)  3. Eat 3 meals per day  - Breakfast try a protein shake as discussed     *Protein Shake Criteria: no more than 210 Calories, at least 20 grams of protein, and less than 10 grams of sugar     4. Meals prep on  for lunches:   - Try a 1/2 sandwich or wrap and pair with 1-2 of the item below:  -- apples, banana, oranges, portion out grapes or berries  -- Vegetables and dip (have vegetables clean and cut and dip portioned out  -- Single serving French or Jordanian (sykr) yogurt or cottage cheese   -- Sting cheese   -- 5-6 crackers and 2-3 slices of cheese  -- deli meat and a piece of string cheese   -- hard boiled eggs or egg salad with vegetables and 5-6 crackers  -Try a fajita salad: Chicken with onion and pepper and cheese on top of a bed of lettuce. Use salsa of the dressing.   - Try a buffalo chicken salad: shredded chicken with buffalo sauce, shedded carrots and chopped celery use ranch or blue cheese for the dressing (2 TBSP or less of dressing)   - Try lettuce wraps instead of a tortilla OR lunch meat roll ups pair with other items  - Try egg/tuna/chicken salad with 5-6 cracker and vegetables to dip it in.      Relating to beverages:  Reduce caffeine/carbonation/calorie containing beverages  Separate fluids from meals by 30 minutes before, during, and after eating    Relating to dietary supplements:  Start a multivitamin containing iron daily     Walk/exercise for 15-30 minutes daily    Initial Consult / Weight Loss    My Plate Planner_English - Pt Education  [email or web address]    Protein Sources for Weight Loss  [email or web address]       Time spent with patient: 30 minutes.  Halie Rosado MS, " LIBAN, ANABELLE

## 2020-11-02 NOTE — PATIENT INSTRUCTIONS
"Thank you for allowing us the privilege of caring for you. We hope we provided you with the excellent service you deserve.   Please let us know if there is anything else we can do for you so that we can be sure you are completely satisfied with your care experience.    To ensure the quality of our services you may be receiving a patient satisfaction survey from an independent patient satisfaction monitoring company.    The greatest compliment you can give is a \"Likely to Recommend\"    Your visit was with Cierra Caballero NP today.    Instructions per today's visit:     Iain MORALEZ, it was great to visit with you today.  Here is a review of our visit.  If our clinic scheduler is not able to reach you please call [phone] to schedule your next appointments.    -protein shake for breakfast   -keep protein shake on hand as a just incase snack if life gets crazy- a back up plan   -keep working on food prepping  -look at veggies/ fruits that you can use for snacks as well   -start ozempic  -follow up in 4 weeks to see how you are tolerating this     Please call our contact center at [phone] to schedule your next appointments.  To find a lab location near you, please call [phone].  For any nursing questions or concerns call Annabelle Mark LPN at [phone] or Tatyana Don RN at [phone]  Please call during clinic hours Monday through Friday 8:00a - 4:00p if you have questions or you can contact us via [employer] at anytime and we will reply during clinic hours.    Lab results will be communicated through My Chart or letter (if My Chart not used). Please call the clinic if you have not received communication after 1 week or if you have any questions.?  Clinic Fax: [phone]  ______________________________________________________________________________________________________________________  Meal Replacement Products:    Here is the link to our new e-store where you can purchase our meal " replacement products     SEAT [employer]iew [employer]anStore  [employer]k/store    The one week starter kit is a great way to sample a variety of products and see what works for you.    If you want more information about the product go to: Fresh Steps Meals  [email or web address]    If you are an employee or [facility] or [facility] please contact your care team for a 10% estore discount    Free Shipping for orders over $75     Benefits of meal replacements products:    Portion and calorie control  Improved nutrition  Structured eating  Simplified food choices  Avoid contact with trigger foods  _________________________________________________________________________________________________________________________________  Interested in working with a health ?  Health coaches work with you to improve your overall health and wellbeing.  They look at the whole person, and may involve discussion of different areas of life, including, but not limited to the four pillars of health (sleep, exercise, nutrition, and stress management). Discuss with your care team if you would like to start working a health .  Health Coaching-3 Pack: Schedule by calling [phone]    $99 for three health coaching visits    Visits may be done in person or via phone    Coaching is a partnership between the  and the client; Coaches do not prescribe or diagnose    Coaching helps inspire the client to reach his/her personal goals   _________________________________________________________________________________________________________________________________  Healthy Lifestyle Support Group   [facility] Weight Management Program  Virtual Support Group Now Available    60 minutes of small group guided discussion, support and resources    Led by Health , Cristy Harden    For questions, and to receive the invite information, contact Cristy at [email or web address]    All our welcome!    One Friday per  month, 12:30pm to 1:30pm  SELF COMPASSION: July 31st  CREATING MY WELLNESS VISION: August 28th  MINDFULNESS PRACTICES FOR A CALM BODY & MIND: September 25th  MINDFUL EATING TOOLS: October 30th  HEALTHY& HAPPY HOLIDAYS: November 20th  OPEN FORUM: December 18th  _______________________________________________________________________________________________________________________________  Connections: Bariatric Care support group  Due to the Covid-19 restrictions, we will be doing our support group virtually using Microsoft Teams. You will need to get an invitation to the group from Jonny Bonilla, Ph.D., LP at [email or web address] and to learn about using Microsoft Teams. The next meeting is on Tuesday, July 14 between 6:30 and 8 PM and there will be no formal speaker.    This group meets the second Tuesday of each month from 6:30 to 8 PM and will be held again at [facility] in the [address] [place] [address] (A-B room) when the Covid-19 restrictions are lifted. The group is led by Jonny Bonilla, Ph.D., Licensed Psychologist, [facility] Comprehensive Weight Management Program. There is no cost for group participation and is open to all [facility] (and those external to this program) pre- and post-operative bariatric surgery patients as well as their support system.   _________________________________________________________________________________________________________________________________  [facility] Get Moving Program  Our team of physical therapists is trained to help you understand and take control of your condition. They will perform a thorough evaluation to determine your ability for activity and develop a customized plan to fit your goals and physical ability.  Scheduling: Unsure if the Get Moving program is right for you? Discuss the program with your medical provider or diabetes educator. You can also call us at [phone] to ask questions or  schedule an appointment.   [facility] Get Moving Program  ______________________________________________________________________________________________________________________  [facility] Diabetes Prevention Program (DPP)  If you have prediabetes and Medicare please contact us by [employer] or phone to learn more about our Diabetes Prevention Program  _______________________________________________________________________________________________________________________  Bluetooth Scale:    We hope to provide you with high quality telephone and virtual healthcare visits while social distancing for COVID-19 is necessary, as well as in the future when virtual visits may be more convenient for you.     Our technology team made it possible for Bluetooth scales to send weight measurements to our electronic medical record. This allows weights from you weighing at home to securely flow into the medical record, which will improve telephone and virtual visits.   Additionally, studies have shown that adults actually lose more weight when their weights are automatically sent to someone else, and also that this process is not stressful for those adults.    Below is a link for purchasing the scale, with a discount code for our patients. You may call your insurance company to see if they will reimburse you for the cost of the scale, as a piece of durable medical equipment. The scales only go up to a weight of 400 pounds. This is an issue and we are working with the developer on increasing this. We found no scales that go over 400lb that have blue-tooth for connecting to [employer].    Scale to purchase: the [employer]  Body  Scale: [email or web address]=aw.ds    Discount Code: We have a discount code for our patients to bring the cost down to $50, Discount code is: UMinrosalbaota_Scale_20%off  Thank you,   [facility] Comprehensive Weight Management  Team                          MEDICATION STARTED AT THIS APPOINTMENT    We are starting a GLP-1 (Glucagon-like Peptide-1) medication. Examples of this medication include Byetta, Bydureon, or Victoza, etc. The medication chosen will depend on insurance coverage, and can be changed to the medication that is covered under your plan. These medications work the same, in that they can help you lose weight and control your blood sugar. One of the ways it works is by slowing down the rate that food leaves your stomach. You feel urbina and will eat less.  It also helps regulate hormones that can help improve your blood sugars.    Usually short acting insulins or oral agents are stopped or decreased by the doctor at the appointment. Low blood sugars are rare but can happen if patients are on insulin or other oral agents. If you notice consistent low sugars or high sugars, your medication may need to be adjusted after your appointment. If this is the case, please call RN and provide her your blood sugar record from the last 3-4 days. The RN will get in touch with the doctor and call you back/[employer] message with recommendations. We tolerate high sugars for a bit, so if sugars are running 180-200, this is ok. As weight starts dropping the blood sugars should too. If readings are consistently over 200 for 1-2 weeks, then you should call the doctor/nurse.    Types of GLP-1 medications include:    Victoza: Starting dose is 0.6 mg for a week, then 1.2 mg for a week, and then 1.8 mg thereafter (if prescribed by doctor). This medication is given daily. Pen needles need to be ordered also.    Ozempic: Starting dose is 0.25 mg once weekly for 4 weeks, and then increase to 0.5 mg weekly. If after 4 weeks of being on 0.5 mg weekly dosing and still wanting additional weight loss and/or blood sugar benefits, check with your doctor to see if they want to increase the dose to 1 mg weekly. Pen needles come in the Ozempic pen  box.    Trulicity: Starting dose is 0.75 mg once weekly.  If after 1-3 months of being on 0.75 mg weekly and still wanting additional weight loss and/or blood sugar benefits, check with your doctor to see if they want to increase the dose to 1.5 mg weekly.    Bydureon: Starting dose is 2 mg and is given weekly. The patient should pick one day a week and give the medication on that day. Pen needles need to be ordered also.    Byetta: Starting dose is 5 mcg twice daily. This is given for the first month. Check with the doctor after a month to see if they want the dose increased to 10 mcg BID. Pen needles need to be ordered also.     Side effects of GLP- Medications include: The most common side effects are all GI related and consist of: nausea, constipation, diarrhea, burping, or gassiness. Patients are advised to eat slowly and less, and nausea typically passes if people can stick it out.     The risk of pancreatitis (inflammation of the pancreas) has been associated with this type of medication, but is very rare.  If you have had pancreatitis in the past, this medication may not be for you. Please let us know about any past history of pancreas problems.      Symptoms of pancreatitis include: Pain in your upper stomach area which  may travel to your back and be worse after eating. Your stomach area may be tender to the touch.  You may have vomiting or nausea and/or have a fever. If you should develop any of these symptoms, stop the medication and contact your primary care doctor. They will do a blood test to check for pancreatitis.         There is a small chance you may have some low blood sugar after taking the medication.   The signs of low blood sugar are:  o Weakness  o Shaky   o Hungry  o Sweating  o Confusion      See below for ways to treat low blood sugar without adding in lots of extra calories.      Treating Low Blood Sugar    If you have symptoms of low blood sugar (sweating, shaking, dizzy, confused) eat  15 grams of carbs and wait 15 minutes:      Glucose Tabs are best for sugars under 70 -  Dex4 or BD Glucose tablets are good, you will need to take 3-4 of these to equal 15 grams.       One small box of raisins    4 oz fruit juice box or   cup fruit juice    1 small apple    1 small banana      cup canned fruit in water      English muffin or a slice of bread with jelly     1 low fat frozen waffle with sugar-free syrup      cup cottage cheese with   cup frozen or fresh blueberries    1 cup skim or low-fat milk      cup whole grain cereal    4-6 crackers such as Triscuits      This medication is usually covered by insurance for patients with a diagnosis of Type 2 Diabetes. Depending on insurance coverage, the medication may be changed to a different formulary, but they all work in the same way. Sometimes a prior authorization is required, which may take up to 1-2 weeks for an insurance company to make a decision if they will cover the medication. Please be patient, you will be notified either way.     Call the nurse at [phone] if you have any questions or concerns. (Do not stop taking it if you don't think it's working. For some people it works without them knowing it.)     In order to get refills of this or any medication we prescribe you must be seen in the medical weight mgmt clinic every 2-4 months. Please have your pharmacy fax a refill request to [phone].

## 2020-11-03 ENCOUNTER — TELEPHONE (OUTPATIENT)
Dept: ENDOCRINOLOGY | Facility: CLINIC | Age: 35
End: 2020-11-03

## 2020-11-03 NOTE — TELEPHONE ENCOUNTER
Prior Authorization Retail Medication Request    Medication/Dose: Ozempic (0.25 or 0.5MG/DOSE) 2MG/1.5ML  ICD code (if different than what is on RX):     Previously Tried and Failed: Watching portions or calories, Exercise, Slimfast    Rationale: Grecia MORALEZ has Class III obesity with a body mass index of 64.22 kg/m . kg/m2 and the following comorbidities: pre-diabetes    Insurance Name: [address] [place] [address] Ph: [phone]   Insurance ID: [id number]       Pharmacy Information (if different than what is on RX)  Name: [id number] - [place], [place] - [address]  Phone: [phone]

## 2020-11-03 NOTE — PATIENT INSTRUCTIONS
Goals focusing on for the next month:     1. Breakfast try a protein shake as discussed     *Protein Shake Criteria: no more than 210 Calories, at least 20 grams of protein, and less than 10 grams of sugar     2. Meals prep on Sunday for lunches:   - Try a 1/2 sandwich or wrap and pair with 1-2 of the item below:  -- apples, banana, oranges, portion out grapes or berries  -- Vegetables and dip (have vegetables clean and cut and dip portioned out  -- Single serving Belarusian or Welsh (sykr) yogurt or cottage cheese   -- Sting cheese   -- 5-6 crackers and 2-3 slices of cheese  -- deli meat and a piece of string cheese   -- hard boiled eggs or egg salad with vegetables and 5-6 crackers  -Try a fajita salad: Chicken with onion and pepper and cheese on top of a bed of lettuce. Use salsa of the dressing.   - Try a buffalo chicken salad: shredded chicken with buffalo sauce, shedded carrots and chopped celery use ranch or blue cheese for the dressing (2 TBSP or less of dressing)   - Try lettuce wraps instead of a tortilla OR lunch meat roll ups pair with other items  - Try egg/tuna/chicken salad with 5-6 cracker and vegetables to dip it in.         3. Walk/exercise for 15-30 minutes daily

## 2020-11-03 NOTE — TELEPHONE ENCOUNTER
PA Initiation    Medication: Ozempic (0.25 or 0.5MG/DOSE) 2MG/1.5ML -   Insurance Company: [facility] - Phone [phone] Fax [phone]  Pharmacy Filling the Rx: [id number] - [place], [place] - [address]  Filling Pharmacy Phone: [phone]  Filling Pharmacy Fax: [phone]  Start Date: 11/3/2020

## 2020-11-05 NOTE — TELEPHONE ENCOUNTER
"PRIOR AUTHORIZATION DENIED    Medication: Ozempic (0.25 or 0.5MG/DOSE) 2MG/1.5ML - Denied    Denial Date: 11/3/2020    Denial Rational:         Appeal Information:     **Please advise if appeal is necessary and place a letter of medical necessity with clinical rationale under the \"letters\" tab in patient's chart and route back to [facility] [[phone]]          "

## 2020-11-09 ENCOUNTER — TELEPHONE (OUTPATIENT)
Dept: ENDOCRINOLOGY | Facility: CLINIC | Age: 35
End: 2020-11-09

## 2020-11-09 DIAGNOSIS — E66.01 CLASS 3 SEVERE OBESITY DUE TO EXCESS CALORIES WITH SERIOUS COMORBIDITY AND BODY MASS INDEX (BMI) OF 60.0 TO 69.9 IN ADULT (H): Primary | ICD-10-CM

## 2020-11-09 DIAGNOSIS — R73.03 PRE-DIABETES: ICD-10-CM

## 2020-11-09 DIAGNOSIS — E66.813 CLASS 3 SEVERE OBESITY DUE TO EXCESS CALORIES WITH SERIOUS COMORBIDITY AND BODY MASS INDEX (BMI) OF 60.0 TO 69.9 IN ADULT (H): Primary | ICD-10-CM

## 2020-11-09 DIAGNOSIS — E88.810 METABOLIC SYNDROME: ICD-10-CM

## 2020-11-09 NOTE — TELEPHONE ENCOUNTER
PA for Ozempic denied. Per Cierra Caballero, CNP will try submitting Saxenda. Patient notified of med change via mychart.

## 2020-11-11 NOTE — TELEPHONE ENCOUNTER
Central Prior Authorization Team   Phone: [phone]      PA Initiation    Medication: Saxenda-PA initiated  Insurance Company: [facility] [employer] [facility] - Phone [phone] Fax [phone]  Pharmacy Filling the Rx: [id number] - [place], [place] - [address]  Filling Pharmacy Phone: [phone]  Filling Pharmacy Fax:    Start Date: 11/11/2020

## 2020-11-12 NOTE — TELEPHONE ENCOUNTER
PRIOR AUTHORIZATION DENIED    Medication: Saxenda-PA Denied    Denial Date: 11/12/2020    Denial Rational: Weight loss medications are excluded from her plan and will not be reviewed.

## 2020-11-16 DIAGNOSIS — E66.813 CLASS 3 SEVERE OBESITY DUE TO EXCESS CALORIES WITH SERIOUS COMORBIDITY AND BODY MASS INDEX (BMI) OF 60.0 TO 69.9 IN ADULT (H): Primary | ICD-10-CM

## 2020-11-16 DIAGNOSIS — R73.03 PRE-DIABETES: ICD-10-CM

## 2020-11-16 DIAGNOSIS — E66.01 CLASS 3 SEVERE OBESITY DUE TO EXCESS CALORIES WITH SERIOUS COMORBIDITY AND BODY MASS INDEX (BMI) OF 60.0 TO 69.9 IN ADULT (H): Primary | ICD-10-CM

## 2020-11-16 RX ORDER — NALTREXONE HYDROCHLORIDE 50 MG/1
TABLET, FILM COATED ORAL
Qty: 30 TABLET | Refills: 3 | Status: SHIPPED | OUTPATIENT
Start: 2020-11-16 | End: 2020-12-07

## 2020-11-16 NOTE — TELEPHONE ENCOUNTER
Per Cierra Caballero CNP, patient will start Naltrexone. Patient notified via [email or web address]. Rx sent to pharmacy.   
detailed exam

## 2020-12-06 ENCOUNTER — HEALTH MAINTENANCE LETTER (OUTPATIENT)
Age: 35
End: 2020-12-06

## 2020-12-06 NOTE — PROGRESS NOTES
"Grecia MORALEZ is a 35 year old female who is being evaluated via a billable video visit.      The patient has been notified of following:     \"This video visit will be conducted via a call between you and your physician/provider. We have found that certain health care needs can be provided without the need for an in-person physical exam.  This service lets us provide the care you need with a video conversation.  If a prescription is necessary we can send it directly to your pharmacy.  If lab work is needed we can place an order for that and you can then stop by our lab to have the test done at a later time.    Video visits are billed at different rates depending on your insurance coverage.  Please reach out to your insurance provider with any questions.    If during the course of the call the physician/provider feels a video visit is not appropriate, you will not be charged for this service.\"    Patient has given verbal consent for Video visit? Yes  How would you like to obtain your AVS? MyChart  If you are dropped from the video visit, the video invite should be resent to: Text to cell phone: [phone]  Will anyone else be joining your video visit? No        Video-Visit Details    Type of service:  Video Visit    Video Start Time: 12:23 PM  Video End Time: 12:39 PM    Originating Location (pt. Location): Other work    Distant Location (provider location):  [facility] [place]     Platform used for Video Visit: [facility] Bariatric Nutrition Consultation Note    Reason For Visit: Nutrition Assessment    Grecia MORALEZ is a 35 year old presenting today for follow-up bariatric nutrition consult.   Pt is interested in laparoscopic sleeve gastrectomy with Dr. Gomez expected surgery in D.  Patient is accompanied by self.  This is pt's 3rd of 3 required nutrition visits prior to surgery.     Pt referred by Cierra Caballero NP on August 11, 2020.  Patient with " "Co-morbidities of obesity including:  Type II DM no  Renal Failure no  Sleep apnea no  Hypertension no   Dyslipidemia no  Joint pain no  Back pain no  GERD yes     Support System Reviewed With Patient 8/6/2020   Who do you have in your support network that can be available to help you for the first 2 weeks after surgery? Yes.   Who can you count on for support throughout your weight loss surgery journey? Yes.       ANTHROPOMETRICS:  Estimated body mass index is 66.18 kg/m  as calculated from the following:    Height as of an earlier encounter on 12/7/20: 1.676 m (5' 6\").    Weight as of 8/11/20: 186 kg (410 lb).     Required weight loss goal pre-op: 41 lbs from initial consult weight (goal weight 369 lbs or less before surgery)       8/6/2020   I have tried the following methods to lose weight Watching portions or calories, Exercise, Slimfast       Weight Loss Questions Reviewed With Patient 8/6/2020   How long have you been overweight? 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 Works [employer].   At home she has 3 kids and a , and feel like she has no time for herself.     She has previously lost weight with meal prepping but it is not always sustainable. She grabs and goes during the work day, some day she does not eat until diner time. Will snack before and after dinner.      Today: 12/7/2020: She has stopped drinking pop: drinks vitamin water zero and water.She continues to meal prep for lunches. The protein shake is going okay in the morning, dicussed blending it or blending with fruit OR trying a different breakfast option.   She notes that she is not feeling as hungry, she does not skip meals but find she is eating a smaller portion.      Diet Recall:   Shake   Meal prep for lunch: salad with chicken,   Dinner: Meat, " "starch vegetables or whatever family makes.     Progress Towards Previous Goals:  Relating To Eatin. Eat slowly (20-30 minutes per meal), chewing foods well (25 chews per bite/applesauce consistency)- focusing more time with dinner: only shakes  2. 9\" Plate method (1/2 plate non-starchy vegetables/fruit, 1/4 plate lean protein, 1/4 plate whole grain starch - no more than 1/2 cup carb/meal)-Met  3. Eat 3 meals per day-Met  - Breakfast try a protein shake as discussed     *Protein Shake Criteria: no more than 210 Calories, at least 20 grams of protein, and less than 10 grams of sugar     4. Meals prep on  for lunches: -Met  - Try a 1/2 sandwich or wrap and pair with 1-2 of the item below:  -- apples, banana, oranges, portion out grapes or berries  -- Vegetables and dip (have vegetables clean and cut and dip portioned out  -- Single serving Persian or Slovenian ([facility]) yogurt or cottage cheese   -- Sting cheese   -- 5-6 crackers and 2-3 slices of cheese  -- deli meat and a piece of string cheese   -- hard boiled eggs or egg salad with vegetables and 5-6 crackers  -Try a fajita salad: Chicken with onion and pepper and cheese on top of a bed of lettuce. Use salsa of the dressing.   - Try a buffalo chicken salad: shredded chicken with buffalo sauce, shedded carrots and chopped celery use ranch or blue cheese for the dressing (2 TBSP or less of dressing)   - Try lettuce wraps instead of a tortilla OR lunch meat roll ups pair with other items  - Try egg/tuna/chicken salad with 5-6 cracker and vegetables to dip it in.      Relating to beverages:  Reduce caffeine/carbonation/calorie containing beverages-Met, lemonade vitamin water, water, 64   Separate fluids from meals by 30 minutes before, during, and after eating-Continues    Relating to dietary supplements:  Start a multivitamin containing iron daily -Met.     Walk/exercise for 15-30 minutes daily-30 minutes daily       Eating Habits 2020   Do you have any " "dietary restrictions? No   Do you currently binge eat (eat a large amount of food in a short time)? Yes   Are you an emotional eater? Yes   Do you get up to eat after falling asleep? No   What foods do you crave? Junk.       ADDITIONAL INFORMATION:    Dining Out History Reviewed With Patient 2020   How often do you dine out? Rarely.   Where do you dine out? (select all that apply) take out   What types of food do you order when you dine out? Chicken Sand's, fries       Physical Activity Reviewed With Patient 2020   How often do you exercise? 3 to 4 times per week   What is the duration of your exercise (in minutes)? 30 Minutes   What types of exercise do you do? walking   What keeps you from being more active?  Pain, Shortness of breath, Worried people will look at me       NUTRITION DIAGNOSIS:  Obesity r/t long history of self-monitoring deficit and excessive energy intake aeb BMI >30 kg/m2.    INTERVENTION:  Intervention Provided/Education:   1. Reviewed and modified previous goals.   2. Provided encouragement and support.   3. Reviewed post-op diet guidelines, GI anatomy of bariatric surgeries, ways to help prepare for post-op diet guidelines pre-operatively, portion/calorie-control, mindful eating and sources of protein.   4. Brainstormed breakfast ideas with patient   5. AVS via [employer]     Questions Reviewed With Patient 2020   How ready are you to make changes regarding your weight? Number 1 = Not ready at all to make changes up to 10 = very ready. 10   How confident are you that you can change? 1 = Not confident that you will be successful making changes up to 10 = very confident. 10       Patient Understanding: good  Expected Compliance: good    GOALS:  Relating To Eatin. Eat slowly (20-30 minutes per meal), chewing foods well (25 chews per bite/applesauce consistency)  2. 9\" Plate method (1/2 plate non-starchy vegetables/fruit, 1/4 plate lean protein, 1/4 plate whole grain starch - no more " than 1/2 cup carb/meal)  3. Eat 3 meals per day  - Breakfast try a protein shake as discussed, try blending it, okay to add 1/2 a banana or 1/2 cup of fruit.     *Protein Shake Criteria: no more than 210 Calories, at least 20 grams of protein, and less than 10 grams of sugar     4. Continues prepping lunches     Relating to beverages:  Continues to avoid caffeine/carbonation/calorie containing beverages  Separate fluids from meals by 30 minutes before, during, and after eating    Relating to dietary supplements:  Continues with  multivitamin containing iron daily     Continues Walk/exercise for 15-30 minutes daily      Time spent with patient: 16 minutes.  Halie Rosado, MS, RD, LD

## 2020-12-07 ENCOUNTER — VIRTUAL VISIT (OUTPATIENT)
Dept: ENDOCRINOLOGY | Facility: CLINIC | Age: 35
End: 2020-12-07
Payer: COMMERCIAL

## 2020-12-07 VITALS — BODY MASS INDEX: 66.18 KG/M2 | HEIGHT: 66 IN

## 2020-12-07 DIAGNOSIS — E66.01 CLASS 3 SEVERE OBESITY DUE TO EXCESS CALORIES WITH SERIOUS COMORBIDITY AND BODY MASS INDEX (BMI) OF 60.0 TO 69.9 IN ADULT (H): Primary | ICD-10-CM

## 2020-12-07 DIAGNOSIS — E66.813 CLASS 3 SEVERE OBESITY DUE TO EXCESS CALORIES WITH SERIOUS COMORBIDITY AND BODY MASS INDEX (BMI) OF 60.0 TO 69.9 IN ADULT (H): Primary | ICD-10-CM

## 2020-12-07 DIAGNOSIS — R73.03 PRE-DIABETES: ICD-10-CM

## 2020-12-07 DIAGNOSIS — Z71.3 NUTRITIONAL COUNSELING: ICD-10-CM

## 2020-12-07 DIAGNOSIS — E66.01 MORBID OBESITY (H): ICD-10-CM

## 2020-12-07 PROCEDURE — 99212 OFFICE O/P EST SF 10 MIN: CPT | Mod: 95 | Performed by: NURSE PRACTITIONER

## 2020-12-07 PROCEDURE — 97803 MED NUTRITION INDIV SUBSEQ: CPT | Mod: 95 | Performed by: DIETITIAN, REGISTERED

## 2020-12-07 ASSESSMENT — PAIN SCALES - GENERAL: PAINLEVEL: NO PAIN (0)

## 2020-12-07 NOTE — LETTER
"12/7/2020       RE: Grecia MORALEZ  [address]  [place] [address]     Dear Colleague,    Thank you for referring your patient, Grecia MORALEZ, to the [facility] [place] at [facility]. Please see a copy of my visit note below.    Grecia MOARLEZ is a 35 year old female who is being evaluated via a billable video visit.      The patient has been notified of following:     \"This video visit will be conducted via a call between you and your physician/provider. We have found that certain health care needs can be provided without the need for an in-person physical exam.  This service lets us provide the care you need with a video conversation.  If a prescription is necessary we can send it directly to your pharmacy.  If lab work is needed we can place an order for that and you can then stop by our lab to have the test done at a later time.    Video visits are billed at different rates depending on your insurance coverage.  Please reach out to your insurance provider with any questions.    If during the course of the call the physician/provider feels a video visit is not appropriate, you will not be charged for this service.\"    Patient has given verbal consent for Video visit? Yes  How would you like to obtain your AVS? MyChart  If you are dropped from the video visit, the video invite should be resent to: Text to cell phone: [phone]  Will anyone else be joining your video visit? No        Video-Visit Details    Type of service:  Video Visit    Video Start Time: 12:23 PM  Video End Time: 12:39 PM    Originating Location (pt. Location): Other work    Distant Location (provider location):  [facility]     Platform used for Video Visit: [employer]      Return Bariatric Nutrition Consultation Note    Reason For Visit: Nutrition Assessment    Grecia MORALEZ is a 35 year old presenting " "today for follow-up bariatric nutrition consult.   Pt is interested in laparoscopic sleeve gastrectomy with Dr. Gomez expected surgery in TBD.  Patient is accompanied by self.  This is pt's 3rd of 3 required nutrition visits prior to surgery.     Pt referred by Cierra Caballero NP on August 11, 2020.  Patient with Co-morbidities of obesity including:  Type II DM no  Renal Failure no  Sleep apnea no  Hypertension no   Dyslipidemia no  Joint pain no  Back pain no  GERD yes     Support System Reviewed With Patient 8/6/2020   Who do you have in your support network that can be available to help you for the first 2 weeks after surgery? Yes.   Who can you count on for support throughout your weight loss surgery journey? Yes.       ANTHROPOMETRICS:  Estimated body mass index is 66.18 kg/m  as calculated from the following:    Height as of an earlier encounter on 12/7/20: 1.676 m (5' 6\").    Weight as of 8/11/20: 186 kg (410 lb).     Required weight loss goal pre-op: 41 lbs from initial consult weight (goal weight 369 lbs or less before surgery)       8/6/2020   I have tried the following methods to lose weight Watching portions or calories, Exercise, Slimfast       Weight Loss Questions Reviewed With Patient 8/6/2020   How long have you been overweight? 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   At home she has 3 kids and a , and feel like she has no time for herself.     She has previously lost weight with meal prepping but it is not always sustainable. She grabs and goes during the work day, some day she does not eat until diner time. Will snack before and after dinner.      Today: 12/7/2020: She has stopped drinking pop: drinks vitamin water zero and water.She continues to meal prep for lunches. The protein shake is " "going okay in the morning, dicussed blending it or blending with fruit OR trying a different breakfast option.   She notes that she is not feeling as hungry, she does not skip meals but find she is eating a smaller portion.      Diet Recall:   Shake   Meal prep for lunch: salad with chicken,   Dinner: Meat, starch vegetables or whatever family makes.     Progress Towards Previous Goals:  Relating To Eatin. Eat slowly (20-30 minutes per meal), chewing foods well (25 chews per bite/applesauce consistency)- focusing more time with dinner: only shakes  2. 9\" Plate method (1/2 plate non-starchy vegetables/fruit, 1/4 plate lean protein, 1/4 plate whole grain starch - no more than 1/2 cup carb/meal)-Met  3. Eat 3 meals per day-Met  - Breakfast try a protein shake as discussed     *Protein Shake Criteria: no more than 210 Calories, at least 20 grams of protein, and less than 10 grams of sugar     4. Meals prep on  for lunches: -Met  - Try a 1/2 sandwich or wrap and pair with 1-2 of the item below:  -- apples, banana, oranges, portion out grapes or berries  -- Vegetables and dip (have vegetables clean and cut and dip portioned out  -- Single serving Citizen of [place] or Liberian (sykr) yogurt or cottage cheese   -- Sting cheese   -- 5-6 crackers and 2-3 slices of cheese  -- deli meat and a piece of string cheese   -- hard boiled eggs or egg salad with vegetables and 5-6 crackers  -Try a fajita salad: Chicken with onion and pepper and cheese on top of a bed of lettuce. Use salsa of the dressing.   - Try a buffalo chicken salad: shredded chicken with buffalo sauce, shedded carrots and chopped celery use ranch or blue cheese for the dressing (2 TBSP or less of dressing)   - Try lettuce wraps instead of a tortilla OR lunch meat roll ups pair with other items  - Try egg/tuna/chicken salad with 5-6 cracker and vegetables to dip it in.      Relating to beverages:  Reduce caffeine/carbonation/calorie containing beverages-Met, " lemonade vitamin water, water, 64   Separate fluids from meals by 30 minutes before, during, and after eating-Continues    Relating to dietary supplements:  Start a multivitamin containing iron daily -Met.     Walk/exercise for 15-30 minutes daily-30 minutes daily       Eating Habits 8/6/2020   Do you have any dietary restrictions? No   Do you currently binge eat (eat a large amount of food in a short time)? Yes   Are you an emotional eater? Yes   Do you get up to eat after falling asleep? No   What foods do you crave? Junk.       ADDITIONAL INFORMATION:    Dining Out History Reviewed With Patient 8/6/2020   How often do you dine out? Rarely.   Where do you dine out? (select all that apply) take out   What types of food do you order when you dine out? Chicken Sand's, fries       Physical Activity Reviewed With Patient 8/6/2020   How often do you exercise? 3 to 4 times per week   What is the duration of your exercise (in minutes)? 30 Minutes   What types of exercise do you do? walking   What keeps you from being more active?  Pain, Shortness of breath, Worried people will look at me       NUTRITION DIAGNOSIS:  Obesity r/t long history of self-monitoring deficit and excessive energy intake aeb BMI >30 kg/m2.    INTERVENTION:  Intervention Provided/Education:   1. Reviewed and modified previous goals.   2. Provided encouragement and support.   3. Reviewed post-op diet guidelines, GI anatomy of bariatric surgeries, ways to help prepare for post-op diet guidelines pre-operatively, portion/calorie-control, mindful eating and sources of protein.   4. Brainstormed breakfast ideas with patient   5. AVS via [employer]     Questions Reviewed With Patient 8/6/2020   How ready are you to make changes regarding your weight? Number 1 = Not ready at all to make changes up to 10 = very ready. 10   How confident are you that you can change? 1 = Not confident that you will be successful making changes up to 10 = very confident. 10  "      Patient Understanding: good  Expected Compliance: good    GOALS:  Relating To Eatin. Eat slowly (20-30 minutes per meal), chewing foods well (25 chews per bite/applesauce consistency)  2. 9\" Plate method (1/2 plate non-starchy vegetables/fruit, 1/4 plate lean protein, 1/4 plate whole grain starch - no more than 1/2 cup carb/meal)  3. Eat 3 meals per day  - Breakfast try a protein shake as discussed, try blending it, okay to add 1/2 a banana or 1/2 cup of fruit.     *Protein Shake Criteria: no more than 210 Calories, at least 20 grams of protein, and less than 10 grams of sugar     4. Continues prepping lunches     Relating to beverages:  Continues to avoid caffeine/carbonation/calorie containing beverages  Separate fluids from meals by 30 minutes before, during, and after eating    Relating to dietary supplements:  Continues with  multivitamin containing iron daily     Continues Walk/exercise for 15-30 minutes daily      Time spent with patient: 16 minutes.  Halie Rosado, MS, RD, LD      "

## 2020-12-07 NOTE — NURSING NOTE
"Chief Complaint   Patient presents with     Follow Up     pre-surg follow up       Vitals:    12/07/20 1117   Height: 1.676 m (5' 6\")       Body mass index is 66.18 kg/m .                         "

## 2020-12-07 NOTE — PATIENT INSTRUCTIONS
"GOALS:  Relating To Eatin. Eat slowly (20-30 minutes per meal), chewing foods well (25 chews per bite/applesauce consistency)  2. 9\" Plate method (1/2 plate non-starchy vegetables/fruit, 1/4 plate lean protein, 1/4 plate whole grain starch - no more than 1/2 cup carb/meal)  3. Eat 3 meals per day  - Breakfast try a protein shake as discussed, try blending it, okay to add 1/2 a banana or 1/2 cup of fruit.   - Try a homemade breakfast burrito with lots of vegetables, or an open faced breakfast sandwich OR 2 boiled eggs and fruit OR greek yogurt/cottage cheese with fruit    *Protein Shake Criteria: no more than 210 Calories, at least 20 grams of protein, and less than 10 grams of sugar     4. Continues prepping lunches     Relating to beverages:  Continues to avoid caffeine/carbonation/calorie containing beverages  Separate fluids from meals by 30 minutes before, during, and after eating    Relating to dietary supplements:  Continues with  multivitamin containing iron daily     Continues Walk/exercise for 15-30 minutes daily    Follow up with RD in one month    Halie Rosado, MS, RD, LD  If you need to schedule or reschedule with a dietitian please call [phone].  "

## 2020-12-07 NOTE — PATIENT INSTRUCTIONS
"Thank you for allowing us the privilege of caring for you. We hope we provided you with the excellent service you deserve.   Please let us know if there is anything else we can do for you so that we can be sure you are completely satisfied with your care experience.    To ensure the quality of our services you may be receiving a patient satisfaction survey from an independent patient satisfaction monitoring company.    The greatest compliment you can give is a \"Likely to Recommend\"    Your visit was with Cierra Caballero NP today.    Instructions per today's visit:     Iain MORALEZ, it was great to visit with you today.  Here is a review of our visit.  If our clinic scheduler is not able to reach you please call [phone] to schedule your next appointments.    -keep up the great work   -look for self care opportunities   -consider working with a therapist   -follow up in 3 months, sooner if needed   -hold off on starting naltrexone or any weight loss medication     Please call our contact center at [phone] to schedule your next appointments.  To find a lab location near you, please call [phone].  For any nursing questions or concerns call Annabelle Mark LPN at [phone] or Tatyana Don RN at [phone]  Please call during clinic hours Monday through Friday 8:00a - 4:00p if you have questions or you can contact us via [employer] at anytime and we will reply during clinic hours.    Lab results will be communicated through My Chart or letter (if My Chart not used). Please call the clinic if you have not received communication after 1 week or if you have any questions.?  Clinic Fax: [phone]  ______________________________________________________________________________________________________________________  Meal Replacement Products:    Here is the link to our new e-store where you can purchase our meal replacement products    [facility] E-Store  [email or web address]    The one " week starter kit is a great way to sample a variety of products and see what works for you.    If you want more information about the product go to: [employer]    If you are an employee or [facility] or [facility] please contact your care team for a 10% estore discount    Free Shipping for orders over $75     Benefits of meal replacements products:    Portion and calorie control  Improved nutrition  Structured eating  Simplified food choices  Avoid contact with trigger foods  _________________________________________________________________________________________________________________________________  Interested in working with a health ?  Health coaches work with you to improve your overall health and wellbeing.  They look at the whole person, and may involve discussion of different areas of life, including, but not limited to the four pillars of health (sleep, exercise, nutrition, and stress management). Discuss with your care team if you would like to start working a health .  Health Coaching-3 Pack: Schedule by calling [phone]    $99 for three health coaching visits    Visits may be done in person or via phone    Coaching is a partnership between the  and the client; Coaches do not prescribe or diagnose    Coaching helps inspire the client to reach his/her personal goals   _________________________________________________________________________________________________________________________________  Healthy Lifestyle Support Group   [facility] Weight Management Program  Virtual Support Group Now Available    60 minutes of small group guided discussion, support and resources    Led by Health , Cristy Harden    For questions, and to receive the invite information, contact Cristy at [email or web address]    All our welcome!    One Friday per month, 12:30pm to 1:30pm  SELF COMPASSION: July 31st  CREATING MY WELLNESS VISION:  August 28th  MINDFULNESS PRACTICES FOR A CALM BODY & MIND: September 25th  MINDFUL EATING TOOLS: October 30th  HEALTHY& HAPPY HOLIDAYS: November 20th  OPEN FORUM: December 18th  _______________________________________________________________________________________________________________________________  Connections: Bariatric Care support group  Due to the Covid-19 restrictions, we will be doing our support group virtually using Microsoft Teams. You will need to get an invitation to the group from Jonny Bonilla, Ph.D., LP at [email or web address] and to learn about using Microsoft Teams. The next meeting is on Tuesday, July 14 between 6:30 and 8 PM and there will be no formal speaker.    This group meets the second Tuesday of each month from 6:30 to 8 PM and will be held again at [facility] in the [address] [place] [address] (A-B room) when the Covid-19 restrictions are lifted. The group is led by Jonny Bonilla, Ph.D., Licensed Psychologist, [facility] Comprehensive Weight Management Program. There is no cost for group participation and is open to all [facility] (and those external to this program) pre- and post-operative bariatric surgery patients as well as their support system.   _________________________________________________________________________________________________________________________________  [facility] Get Moving Program  Our team of physical therapists is trained to help you understand and take control of your condition. They will perform a thorough evaluation to determine your ability for activity and develop a customized plan to fit your goals and physical ability.  Scheduling: Unsure if the Get Moving program is right for you? Discuss the program with your medical provider or diabetes educator. You can also call us at [phone] to ask questions or schedule an appointment.   [facility] Get Moving  Program  ______________________________________________________________________________________________________________________  [facility] Diabetes Prevention Program (DPP)  If you have prediabetes and Medicare please contact us by [employer] or phone to learn more about our Diabetes Prevention Program  _______________________________________________________________________________________________________________________  Bluetooth Scale:    We hope to provide you with high quality telephone and virtual healthcare visits while social distancing for COVID-19 is necessary, as well as in the future when virtual visits may be more convenient for you.     Our technology team made it possible for Bluetooth scales to send weight measurements to our electronic medical record. This allows weights from you weighing at home to securely flow into the medical record, which will improve telephone and virtual visits.   Additionally, studies have shown that adults actually lose more weight when their weights are automatically sent to someone else, and also that this process is not stressful for those adults.    Below is a link for purchasing the scale, with a discount code for our patients. You may call your insurance company to see if they will reimburse you for the cost of the scale, as a piece of durable medical equipment. The scales only go up to a weight of 400 pounds. This is an issue and we are working with the developer on increasing this. We found no scales that go over 400lb that have blue-tooth for connecting to drop.io.    Scale to purchase: the [employer]  Body  Scale: [email or web address]=aw.ds    Discount Code: We have a discount code for our patients to bring the cost down to $50, Discount code is: [place]_Scale_20%off  Thank you,   [facility] Comprehensive Weight Management Team

## 2020-12-07 NOTE — LETTER
"2020       RE: Grecia MORALEZ  [address]  [facility] [address]     Dear Colleague,    Thank you for referring your patient, Grecia MORALEZ, to the [facility] WEIGHT MANAGEMENT CLINIC [place] at [facility]. Please see a copy of my visit note below.    Grecia MORALEZ is a 35 year old female who is being evaluated via a billable video visit.      The patient has been notified of following:     \"This video visit will be conducted via a call between you and your physician/provider. We have found that certain health care needs can be provided without the need for an in-person physical exam.  This service lets us provide the care you need with a video conversation.  If a prescription is necessary we can send it directly to your pharmacy.  If lab work is needed we can place an order for that and you can then stop by our lab to have the test done at a later time.    Video visits are billed at different rates depending on your insurance coverage.  Please reach out to your insurance provider with any questions.    If during the course of the call the physician/provider feels a video visit is not appropriate, you will not be charged for this service.\"    Patient has given verbal consent for Video visit? Yes  How would you like to obtain your AVS? MyChart  If you are dropped from the video visit, the video invite should be resent to: Text to cell phone: [phone]  Will anyone else be joining your video visit? No        Video-Visit Details    Type of service:  Video Visit    Video Start Time: 1147  Video End Time: 1156    Originating Location (pt. Location): Home    Distant Location (provider location):  [facility] WEIGHT MANAGEMENT [facility]     Platform used for Video Visit: Kartik Caballero NP          Pre-Bariatric Surgery Note    Kayla Arcos    Date: 2020     RE: Grecia MORALEZ    MR#: [id number]   : 1985 " "  Date of Visit: Dec 7, 2020    REASON FOR VISIT: Preoperative evaluation for possible weight loss surgery    Dear Kayla Abbott,    I had the pleasure of seeing your patient, Grecia MORALEZ, in my preoperative bariatric clinic.    As you know, she is morbidly obese and considering weight loss surgery to treat obesity in association with her medical conditions of obesity.  Her consult weight was 410.  She has lost ? pounds since her consult weight. She has not met her required pre-surgery weight. 369lb time of surgery     Topiramate- tingling in extremities unbearable  Ozempic for prediabetes not covered. Insurance also denied saxenda  Prescribed naltrexone but didn't start given increased anxiety. Saw PCP and restarted buproprion and sertraline.     Today:   80 hours a week for work  Lots of stress. Grieving loss of 3 family and patient from covid in the last 2 months    Mood is improving with restarting sertraline (100mg) buproprion (150mg)  Cut out soda, doing shakes in the morning   Increasing activity   Fears adding another medication due to concern for side effects at this time          Most recent weights:  Wt Readings from Last 4 Encounters:   20 (!) 186 kg (410 lb)       Please refer to initial consult note from date 2020 for patient's weight history and co-morbidities.    ROS    No past medical history on file.    Past Surgical History:   Procedure Laterality Date     AS REMOVAL OF OVARIAN CYST(S)        SECTION      x2       Current Outpatient Medications   Medication     ALPRAZolam (XANAX) 0.25 MG tablet     buPROPion (WELLBUTRIN SR) 150 MG 12 hr tablet     naltrexone (DEPADE/REVIA) 50 MG tablet     omeprazole (PRILOSEC) 20 MG DR capsule     PROAIR  (90 Base) MCG/ACT inhaler     No current facility-administered medications for this visit.        Allergies   Allergen Reactions     Latex Rash       PHYSICAL EXAMINATION:  Ht 1.676 m (5' 6\")   BMI 66.18 kg/m     Body mass " index is 66.18 kg/m .   NAD NCAT  Respiratory: breathing unlabored  Abdomen: obese, soft/nt/nd    Special testing: not indicated       I emphasized exercise and activity behavior along with appropriate food choice as the main foundation for weight loss with surgery providing surgical reinforcement of the appropriate behavior set.    PLAN:  -Follow up in 3 months, sooner if needed  -will forgo adding naltrexone as mood feels good right now and she is making great changes towards post surgical goals  -discussed considering working with therapist to help with some of the stress and grieving she is going though. She'll let me know if she needs a referral.     Updated task list given to patient:        Review of general surgery weight loss process    1. Complete preoperative requirements, including weight loss.  Final weight check to confirm MANDATORY weight loss requirement must be documented on a clinic scale.    2. Discuss prior authorization with .    3. History and physical evaluation by PCP of PAC clinic within 30 days of surgery date, preoperative class, and weight check (weigh-in visit) to be scheduled by patient.  Pre-anesthesia clinic for risk evaluation to be scheduled by anesthesia clinic.    4. We cannot guarantee that patient will qualify for surgery unless all preoperative requirements are met, prior authorization from primary insurance company is granted, and insurance changes do not occur.    5. It is possible for patients to regain all weight after weight loss surgery unless they follow guidelines prescribed by our bariatric center.    6. All patients with gastrointestinal complaints after weight loss surgery must have complaints conveyed to the bariatric team for appropriate treatment.    7. Vitamin deficiencies may develop post-bariatric surgery and annual laboratory testing should be performed.    8. Persistent nausea/vomiting after bariatric surgery entails risk of thiamine  deficiency and should be treated early.  Vitamin B12 deficiency may develop, especially after gastric bypass surgery and must be recognized.        If you have any questions about our plans please don't hesitate to contact me.    Sincerely,    Cierra Caballero NP    I spent a total of 10 minutes face to face with Grecia MORALEZ during today's office visit.  Over 50% of this time was spent counseling the patient and/or coordinating care.

## 2020-12-07 NOTE — PROGRESS NOTES
"Grecia MORALEZ is a 35 year old female who is being evaluated via a billable video visit.      The patient has been notified of following:     \"This video visit will be conducted via a call between you and your physician/provider. We have found that certain health care needs can be provided without the need for an in-person physical exam.  This service lets us provide the care you need with a video conversation.  If a prescription is necessary we can send it directly to your pharmacy.  If lab work is needed we can place an order for that and you can then stop by our lab to have the test done at a later time.    Video visits are billed at different rates depending on your insurance coverage.  Please reach out to your insurance provider with any questions.    If during the course of the call the physician/provider feels a video visit is not appropriate, you will not be charged for this service.\"    Patient has given verbal consent for Video visit? Yes  How would you like to obtain your AVS? MyChart  If you are dropped from the video visit, the video invite should be resent to: Text to cell phone: [phone]  Will anyone else be joining your video visit? No        Video-Visit Details    Type of service:  Video Visit    Video Start Time: 1147  Video End Time: 1156    Originating Location (pt. Location): Home    Distant Location (provider location):  [facility] WEIGHT MANAGEMENT CLINIC [place]     Platform used for Video Visit: Kartik Caballero NP          Pre-Bariatric Surgery Note    Kayla Arcos    Date: 2020     RE: Grecia MORALEZ    MR#: [id number]   : 1985   Date of Visit: Dec 7, 2020    REASON FOR VISIT: Preoperative evaluation for possible weight loss surgery    Dear Kayla Abbott,    I had the pleasure of seeing your patient, Grecia MORALEZ, in my preoperative bariatric clinic.    As you know, she is morbidly obese and considering weight loss surgery to treat " "obesity in association with her medical conditions of obesity.  Her consult weight was 410.  She has lost ? pounds since her consult weight. She has not met her required pre-surgery weight. 369lb time of surgery     Topiramate- tingling in extremities unbearable  Ozempic for prediabetes not covered. Insurance also denied saxenda  Prescribed naltrexone but didn't start given increased anxiety. Saw PCP and restarted buproprion and sertraline.     Today:   80 hours a week for work  Lots of stress. Grieving loss of 3 family and patient from covid in the last 2 months    Mood is improving with restarting sertraline (100mg) buproprion (150mg)  Cut out soda, doing shakes in the morning   Increasing activity   Fears adding another medication due to concern for side effects at this time          Most recent weights:  Wt Readings from Last 4 Encounters:   20 (!) 186 kg (410 lb)       Please refer to initial consult note from date 2020 for patient's weight history and co-morbidities.    ROS    No past medical history on file.    Past Surgical History:   Procedure Laterality Date     AS REMOVAL OF OVARIAN CYST(S)        SECTION      x2       Current Outpatient Medications   Medication     ALPRAZolam (XANAX) 0.25 MG tablet     buPROPion (WELLBUTRIN SR) 150 MG 12 hr tablet     naltrexone (DEPADE/REVIA) 50 MG tablet     omeprazole (PRILOSEC) 20 MG DR capsule     PROAIR  (90 Base) MCG/ACT inhaler     No current facility-administered medications for this visit.        Allergies   Allergen Reactions     Latex Rash       PHYSICAL EXAMINATION:  Ht 1.676 m (5' 6\")   BMI 66.18 kg/m     Body mass index is 66.18 kg/m .   NAD NCAT  Respiratory: breathing unlabored  Abdomen: obese, soft/nt/nd    Special testing: not indicated       I emphasized exercise and activity behavior along with appropriate food choice as the main foundation for weight loss with surgery providing surgical reinforcement of the appropriate " behavior set.    PLAN:  -Follow up in 3 months, sooner if needed  -will forgo adding naltrexone as mood feels good right now and she is making great changes towards post surgical goals  -discussed considering working with therapist to help with some of the stress and grieving she is going though. She'll let me know if she needs a referral.     Updated task list given to patient:        Review of general surgery weight loss process    1. Complete preoperative requirements, including weight loss.  Final weight check to confirm MANDATORY weight loss requirement must be documented on a clinic scale.    2. Discuss prior authorization with .    3. History and physical evaluation by PCP of PAC clinic within 30 days of surgery date, preoperative class, and weight check (weigh-in visit) to be scheduled by patient.  Pre-anesthesia clinic for risk evaluation to be scheduled by anesthesia clinic.    4. We cannot guarantee that patient will qualify for surgery unless all preoperative requirements are met, prior authorization from primary insurance company is granted, and insurance changes do not occur.    5. It is possible for patients to regain all weight after weight loss surgery unless they follow guidelines prescribed by our bariatric center.    6. All patients with gastrointestinal complaints after weight loss surgery must have complaints conveyed to the bariatric team for appropriate treatment.    7. Vitamin deficiencies may develop post-bariatric surgery and annual laboratory testing should be performed.    8. Persistent nausea/vomiting after bariatric surgery entails risk of thiamine deficiency and should be treated early.  Vitamin B12 deficiency may develop, especially after gastric bypass surgery and must be recognized.        If you have any questions about our plans please don't hesitate to contact me.    Sincerely,    Cierra Caballero NP    I spent a total of 10 minutes face to face with Grecia RICHTER  NAOMY during today's office visit.  Over 50% of this time was spent counseling the patient and/or coordinating care.

## 2021-01-13 ENCOUNTER — VIRTUAL VISIT (OUTPATIENT)
Dept: PSYCHOLOGY | Facility: CLINIC | Age: 36
End: 2021-01-13
Payer: COMMERCIAL

## 2021-01-13 DIAGNOSIS — Z03.89 NO DIAGNOSIS ON AXIS I: Primary | ICD-10-CM

## 2021-01-20 ENCOUNTER — TELEPHONE (OUTPATIENT)
Dept: ENDOCRINOLOGY | Facility: CLINIC | Age: 36
End: 2021-01-20

## 2021-02-10 ENCOUNTER — TELEPHONE (OUTPATIENT)
Dept: ENDOCRINOLOGY | Facility: CLINIC | Age: 36
End: 2021-02-10

## 2021-02-10 NOTE — TELEPHONE ENCOUNTER
Reason for call:  Other   Patient called regarding (reason for call): Patient calling because she missed her psych evaluation that was scheduled back in January because patient was sick. She is wondering how to reschedule that and would like a call back to discuss.    Additional comments: none    Phone number to reach patient:  Home number on file [phone] (home)    Best Time:  anytime    Can we leave a detailed message on this number?  YES    Travel screening: Not Applicable

## 2021-02-24 ENCOUNTER — TELEPHONE (OUTPATIENT)
Dept: ENDOCRINOLOGY | Facility: CLINIC | Age: 36
End: 2021-02-24

## 2021-02-24 NOTE — TELEPHONE ENCOUNTER
Note  Patient called on 02/10/21 and patient calling again today cause no one has got back to her about rescheduling her psych evaluation. Patient would like a call back to discuss.                 Reason for call:  Other   Patient called regarding (reason for call): Patient calling because she missed her psych evaluation that was scheduled back in January because patient was sick. She is wondering how to reschedule that and would like a call back to discuss.    Additional comments: none     Phone number to reach patient:  Home number on file [phone] (home)     Best Time:  anytime     Can we leave a detailed message on this number?  YES     Travel screening: Not Applicable

## 2021-03-02 ENCOUNTER — VIRTUAL VISIT (OUTPATIENT)
Dept: ENDOCRINOLOGY | Facility: CLINIC | Age: 36
End: 2021-03-02
Payer: COMMERCIAL

## 2021-03-02 ENCOUNTER — CARE COORDINATION (OUTPATIENT)
Dept: ENDOCRINOLOGY | Facility: CLINIC | Age: 36
End: 2021-03-02

## 2021-03-02 ENCOUNTER — VIRTUAL VISIT (OUTPATIENT)
Dept: PSYCHOLOGY | Facility: CLINIC | Age: 36
End: 2021-03-02
Payer: COMMERCIAL

## 2021-03-02 DIAGNOSIS — E66.01 CLASS 3 SEVERE OBESITY DUE TO EXCESS CALORIES WITH SERIOUS COMORBIDITY AND BODY MASS INDEX (BMI) OF 60.0 TO 69.9 IN ADULT (H): Primary | ICD-10-CM

## 2021-03-02 DIAGNOSIS — F41.9 ANXIETY: Primary | ICD-10-CM

## 2021-03-02 DIAGNOSIS — F41.9 ANXIETY: ICD-10-CM

## 2021-03-02 DIAGNOSIS — Z71.3 NUTRITIONAL COUNSELING: ICD-10-CM

## 2021-03-02 DIAGNOSIS — E66.01 MORBID OBESITY (H): ICD-10-CM

## 2021-03-02 DIAGNOSIS — E66.01 MORBID OBESITY (H): Primary | ICD-10-CM

## 2021-03-02 DIAGNOSIS — E66.813 CLASS 3 SEVERE OBESITY DUE TO EXCESS CALORIES WITH SERIOUS COMORBIDITY AND BODY MASS INDEX (BMI) OF 60.0 TO 69.9 IN ADULT (H): Primary | ICD-10-CM

## 2021-03-02 PROCEDURE — 90791 PSYCH DIAGNOSTIC EVALUATION: CPT | Mod: 95 | Performed by: PSYCHOLOGIST

## 2021-03-02 PROCEDURE — 97803 MED NUTRITION INDIV SUBSEQ: CPT | Performed by: DIETITIAN, REGISTERED

## 2021-03-02 ASSESSMENT — COLUMBIA-SUICIDE SEVERITY RATING SCALE - C-SSRS
2. HAVE YOU ACTUALLY HAD ANY THOUGHTS OF KILLING YOURSELF?: NO
1. IN THE PAST MONTH, HAVE YOU WISHED YOU WERE DEAD OR WISHED YOU COULD GO TO SLEEP AND NOT WAKE UP?: NO
1. IN THE PAST MONTH, HAVE YOU WISHED YOU WERE DEAD OR WISHED YOU COULD GO TO SLEEP AND NOT WAKE UP?: NO
2. HAVE YOU ACTUALLY HAD ANY THOUGHTS OF KILLING YOURSELF LIFETIME?: NO

## 2021-03-02 NOTE — PATIENT INSTRUCTIONS
"GOALS:  Relating To Eatin. Eat slowly (20-30 minutes per meal), chewing foods well (25 chews per bite/applesauce consistency)  2. 9\" Plate method (1/2 plate non-starchy vegetables/fruit, 1/4 plate lean protein, 1/4 plate whole grain starch - no more than 1/2 cup carb/meal)  3. Eat 3 meals per day  - Breakfast try a protein shake as discussed, try blending it, okay to add 1/2 a banana or 1/2 cup of fruit.     *Protein Shake Criteria: no more than 210 Calories, at least 20 grams of protein, and less than 10 grams of sugar     4. Continues prepping lunches     Relating to beverages:  Continues to avoid caffeine/carbonation/calorie containing beverages  Separate fluids from meals by 30 minutes before, during, and after eating    Relating to dietary supplements:  Continues with  multivitamin containing iron daily     Continues Walk/exercise for 15-30 minutes daily    Health Psychologist and Counseling center: Referral placed. Please call to set up appointment    [facility]   Page Sher, PhD,LP           [phone]  Kristyn Lyman PhD (cannot accept Medicare) [phone]  Ashwin Travis: [phone]  Corina Anderson: [phone]  Alex Pandya: [phone]    [facility]: [phone]      "

## 2021-03-02 NOTE — LETTER
"3/2/2021       RE: Grecia LAU  [address] [facility] [address]     Dear Colleague,    Thank you for referring your patient, Grceia LAU, to the [facility]. Please see a copy of my visit note below.    Grecia Lau is a 35 year old who is being evaluated via a billable telephone visit.     The patient has been notified of following:     \"This telephone visit will be conducted via a call between you and your physician/provider. We have found that certain health care needs can be provided without the need for a physical exam.  This service lets us provide the care you need with a short phone conversation.  If a prescription is necessary we can send it directly to your pharmacy.  If lab work is needed we can place an order for that and you can then stop by our lab to have the test done at a later time.    Telephone visits are billed at different rates depending on your insurance coverage. During this emergency period, for some insurers they may be billed the same as an in-person visit.  Please reach out to your insurance provider with any questions.    If during the course of the call the physician/provider feels a telephone visit is not appropriate, you will not be charged for this service.\"    Patient has given verbal consent for Telephone visit?  Yes    What phone number would you like to be contacted at? [phone]    How would you like to obtain your AVS? [employer]    Phone call duration: 23 minutes    During this virtual visit the patient is located in [place], patient verifies this as the location during the entirety of this visit.     Phone Start Time: 8:38 AM  Phone End Time: 9:01 AM      Return Bariatric Nutrition Consultation Note    Reason For Visit: Nutrition Assessment    Grecia LAU is a 35 year old presenting today for follow-up bariatric nutrition consult.   Pt is " "interested in laparoscopic sleeve gastrectomy with Dr. Gomez expected surgery in TBD.  Patient is accompanied by self.  This is pt's 4th of 3 required nutrition visits prior to surgery.     Pt referred by Cierra Caballero NP on August 11, 2020.  Patient with Co-morbidities of obesity including:  Type II DM no  Renal Failure no  Sleep apnea no  Hypertension no   Dyslipidemia no  Joint pain no  Back pain no  GERD yes     Support System Reviewed With Patient 8/6/2020   Who do you have in your support network that can be available to help you for the first 2 weeks after surgery? Yes.   Who can you count on for support throughout your weight loss surgery journey? Yes.       ANTHROPOMETRICS:  Estimated body mass index is 66.18 kg/m  as calculated from the following:    Height as of 12/7/20: 1.676 m (5' 6\").    Weight as of 8/11/20: 186 kg (410 lb).   No recent weight--> Anxiexty about getting on the scale     Required weight loss goal pre-op: 41 lbs from initial consult weight (goal weight 369 lbs or less before surgery)       8/6/2020   I have tried the following methods to lose weight Watching portions or calories, Exercise, Slimfast       Weight Loss Questions Reviewed With Patient 8/6/2020   How long have you been overweight? 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   At home she has 3 kids and a , and feel like she has no time for herself.     She has previously lost weight with meal prepping but it is not always sustainable. She grabs and goes during the work day, some day she does not eat until diner time. Will snack before and after dinner.      Today: Pt reports she feels amazing today, was really sick 2-3 week: thinking she had COVID ( no positive test, got tested really late). Her PCP believes she had COVID " "per pt's report.     She was unaware she has her psych evaluation at 9 am today. She is frustrated that she did not know she has her evaluation.     Discussed the nest steps for surgery, she is very anxious about surgery and would like to see health psychology prior to surgery even if it pushes her surgery date back, she would like to be in the right head space. Her anxiety is holding her back from getting on the scale, she feels she is losing weight, down a size in leggings but still is fearful of the scale.   Discussed other determinates of health besides weight, she said she felt \"amazing\" earlier in the visit, disucssed mood and labs and being able to move easier.      Diet Recall: per last RD visit    Shake   Meal prep for lunch: salad with chicken,   Dinner: Meat, starch vegetables or whatever family makes.     Progress Towards Previous Goals:  Relating To Eatin. Eat slowly (20-30 minutes per meal), chewing foods well (25 chews per bite/applesauce consistency)-Did not discuss today   2. 9\" Plate method (1/2 plate non-starchy vegetables/fruit, 1/4 plate lean protein, 1/4 plate whole grain starch - no more than 1/2 cup carb/meal)-Did not discuss today   3. Eat 3 meals per day-Did not discuss today   - Breakfast try a protein shake as discussed, try blending it, okay to add 1/2 a banana or 1/2 cup of fruit.     *Protein Shake Criteria: no more than 210 Calories, at least 20 grams of protein, and less than 10 grams of sugar     4. Continues prepping lunches -Did not discuss today     Relating to beverages:  Continues to avoid caffeine/carbonation/calorie containing beverages-Did not discuss today   Separate fluids from meals by 30 minutes before, during, and after eatingDid not discuss today     Relating to dietary supplements:  Continues with  multivitamin containing iron daily Did not discuss today     Continues Walk/exercise for 15-30 minutes daily-Did not discuss today       Eating Habits 2020   Do " "you have any dietary restrictions? No   Do you currently binge eat (eat a large amount of food in a short time)? Yes   Are you an emotional eater? Yes   Do you get up to eat after falling asleep? No   What foods do you crave? Junk.       ADDITIONAL INFORMATION:    Dining Out History Reviewed With Patient 2020   How often do you dine out? Rarely.   Where do you dine out? (select all that apply) take out   What types of food do you order when you dine out? Chicken Sand's, fries       Physical Activity Reviewed With Patient 2020   How often do you exercise? 3 to 4 times per week   What is the duration of your exercise (in minutes)? 30 Minutes   What types of exercise do you do? walking   What keeps you from being more active?  Pain, Shortness of breath, Worried people will look at me       NUTRITION DIAGNOSIS:  Obesity r/t long history of self-monitoring deficit and excessive energy intake aeb BMI >30 kg/m2.    INTERVENTION:  Intervention Provided/Education:   1. Provided encouragement and support.   2. Briefly Reviewed post-op diet guidelines, GI anatomy of bariatric surgeries, ways to help prepare for post-op diet guidelines pre-operatively, portion/calorie-control, mindful eating and sources of protein.   3. Dicussed tasklist and next step for surgery    4. Dicussed anxiety and referral placed to health psychology.   5. AVS via [employer]     Questions Reviewed With Patient 2020   How ready are you to make changes regarding your weight? Number 1 = Not ready at all to make changes up to 10 = very ready. 10   How confident are you that you can change? 1 = Not confident that you will be successful making changes up to 10 = very confident. 10       Patient Understanding: good  Expected Compliance: good    GOALS:  Relating To Eatin. Eat slowly (20-30 minutes per meal), chewing foods well (25 chews per bite/applesauce consistency)  2. 9\" Plate method (1/2 plate non-starchy vegetables/fruit, 1/4 plate lean " protein, 1/4 plate whole grain starch - no more than 1/2 cup carb/meal)  3. Eat 3 meals per day  - Breakfast try a protein shake as discussed, try blending it, okay to add 1/2 a banana or 1/2 cup of fruit.     *Protein Shake Criteria: no more than 210 Calories, at least 20 grams of protein, and less than 10 grams of sugar     4. Continues prepping lunches     Relating to beverages:  Continues to avoid caffeine/carbonation/calorie containing beverages  Separate fluids from meals by 30 minutes before, during, and after eating    Relating to dietary supplements:  Continues with  multivitamin containing iron daily     Continues Walk/exercise for 15-30 minutes daily    Health Psychologist and Counseling center: Referral placed. Please call to set up appointment    [facility]   Page Sher, PhD,LP           [phone]  Kristyn Lyman PhD (cannot accept Medicare) [phone]  Ashwin Travis: [phone]  Corina Anderson: [phone]  Alex Pandya: [phone]    [facility]: [phone]      Time spent with patient: 23 minutes.  Halie Rosado, MS, RD, LD

## 2021-03-02 NOTE — PROGRESS NOTES
"[facility]   Provider Name:  Shirley Stevenson     Credentials:  PhD, LP    PATIENT'S NAME: Grecia MORALEZ  PREFERRED NAME: Grecia  PRONOUNS:    She/Her  MRN: [id number]  : 1985  ADDRESS: [address] [place] [id number]   ACCT. NUMBER:  [id number]  DATE OF SERVICE: 3/02/21  START TIME: 9:00  END TIME: 9:30  PREFERRED PHONE: [phone]  May we leave a program related message: Yes  SERVICE MODALITY:  Phone Visit:      Provider verified identity through the following two step process.  Patient provided:  Patient     The patient has been notified of the following:      \"We have found that certain health care needs can be provided without the need for a face to face visit.  This service lets us provide the care you need with a phone conversation.       I will have full access to your [facility] medical record during this entire phone call.   I will be taking notes for your medical record.      Since this is like an office visit, we will bill your insurance company for this service.       There are potential benefits and risks of telephone visits (e.g. limits to patient confidentiality) that differ from in-person visits.?Confidentiality still applies for telephone services, and nobody will record the visit.  It is important to be in a quiet, private space that is free of distractions (including cell phone or other devices) during the visit.??      If during the course of the call I believe a telephone visit is not appropriate, you will not be charged for this service\"     Consent has been obtained for this service by care team member: Yes     UNIVERSAL ADULT Mental Health DIAGNOSTIC ASSESSMENT      Identifying Information:  Patient is a 35 year old, , , female. The pronoun use throughout this assessment reflects the patient's chosen pronoun. Patient was referred for an assessment by primary care provider. Patient attended the session alone.     Chief Complaint: " "  The reason for seeking services at this time is: \"bariatric presurgical evaluation\"   The problems began in childhood. She stated, \"It got more out of hand when I was 12 or 13 years old.\"    Social/Family History:  Patient reported they grew up in [place], [place]. She was raised by grandmother and grandfather. She lived with her dad but he worked construction and was gone all day so she was with her grandparents when she wasn't in school. Her parents  when she was very young. She saw her mother \"off and on\" but she felt she wasn't ready to be a mother (they were 17 years old). She stated, \"I don't think that's an excuse because I had my son at age 19 and didn't walk away.\" She was an only child. She has two little sisters who are 14 and 12 (from her father's side). Patient reported that their childhood was \"good, it was okay.\" She stated, \"With my parents, we hung out, but my grandparents raised me. I consider them to be my best friends.\" Patient described their current relationships with family of origin as \"fine.\"  Client reported that she has a better relationship with her mother now and they have \"moved on.\" They talk/text most days. She talks to her father occasionally (2-3 times per week).    The patient describes their cultural background as American. Cultural influences and impact on patient's life structure, values, norms, and healthcare: none reported.  Contextual influences on patient's health include: Individual Factors had children at a young age; her parents were young when she was born and she was raised by her grandparents. These factors will be addressed in the Preliminary Treatment plan.  Patient identified their preferred language to be English. Patient reported they does not need the assistance of an  or other support involved in therapy.     Patient reported had no significant delays in developmental tasks.  Patient's highest education level was some college. Patient identified " "the following learning problems: none reported.  Modifications will not be used to assist communication in therapy.  Patient reports they are  able to understand written materials.    Patient reported the following relationship history: one marriage. Patient's current relationship status is  for 11 years. They have been together for 13 years. They  for 2 years and reconciled in August 2020. They are doing much better.  Patient identified their sexual orientation as heterosexual.  Patient reported having three children; sons ages 15, 13, 11. The eldest two are with a different father (they had a 2.5 year relationship \"off and on\"). This person \"disappeared\" and didn't want anything to do with them; he came back a year later and said he wanted to be a family and Client got pregnant again and then he \"up and left for good.\" Client hasn't seen or talked to them since that time. Her  is a good father to her kids. They were having internet issues so remote learning has been a struggle (the school sent home equipment so they could participate). Patient identified parents, spouse and grandmother as part of their support system. Patient identified the quality of these relationships as good.      Patient's current living/housing situation involves staying in own home/apartment.  They live with  and 3 sons and they report that housing is stable.     Patient is currently employed full time and reports they are able to function appropriately at work. 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 She has been doing this for a little over 2 years (she has been with the company for a little over 5 years). Patient reports their finances are obtained through employment and spouse. Patient does not identify finances as a current stressor.      Patient reported that they have not been involved with the legal system. Patient denies being on probation / parole / under the jurisdiction of the court.    Personal and Family " "Medical History:   Patient does not report a family history of mental health concerns. Patient reports family history is not on file.     Patient does report Mental Health Diagnosis and/or Treatment.  Patient Patient reported the following previous diagnoses which includes: an Anxiety Disorder and Depression.  Patient reported symptoms began about 10 years ago. She noted that she doesn't know what caused this to start; she wants to do therapy try to \"pinpoint the issues.\"  Patient has received mental health services in the past: medication from PCP. She started medication about 10 years ago. Psychiatric Hospitalizations: None.  Patient denies a history of civil commitment.  Currently, patient is receiving other mental health services. These include medications from PCP (Xanax, Sertraline, and Wellbutrin). Client has been referred for therapy. She noted that when she \"thinks about things\" she can feel anxious (\"situational\"). For instance, if she thinks about upcoming surgery she will think about \"the worst thing happening.\" She feels things have gotten a lot better (she and her  were  for a few years but they have reconciled and things have \"fallen back into place\") and she is feeling better and doing better. Her children were having a hard time adjusting to the pandemic because they are social and \"popular\" and wanted to see their friends. She feels she has focused more on making sure they are okay and has been less concerned about herself (she is on the \"back burner\").    Patient has had a physical exam to rule out medical causes for current symptoms.  Date of last physical exam was within the past year. Client was encouraged to follow up with PCP if symptoms were to develop. The patient has a [facility] Primary Care Provider, who is named Kayla Arcos.  Patient reports the following current medical concerns: obesity, GERD. She feels that diet changes have improved reflux issues. Patient denies any " issues with pain.. There are significant appetite / nutritional concerns / weight changes. Client would like to lose weight with the assistance of bariatric surgery. Patient does not report a history of head injury / trauma / cognitive impairment.     Patient reports current meds as:   Outpatient Medications Marked as Taking for the 3/2/21 encounter (Appointment) with Shirley Stevenson, PhD   Medication Sig     ALPRAZolam (XANAX) 0.25 MG tablet Take 0.25 mg by mouth     buPROPion (WELLBUTRIN SR) 150 MG 12 hr tablet TAKE 1 TABLET BY MOUTH 2 TIMES A DAY     PROAIR  (90 Base) MCG/ACT inhaler INHALE 2 PUFFS BY MOUTH EVERY 4 HOURS IF NEEDED       Medication Adherence:  Patient reports taking prescribed medications as prescribed.    Patient Allergies:    Allergies   Allergen Reactions     Latex Rash       Medical History:  No past medical history on file.      Current Mental Status Exam:   Appearance:  Unable to assess on phone     Eye Contact:  Unable to assess on phone  Psychomotor:  Unable to assess on phone      Gait / station:  Unable to assess on phone  Attitude / Demeanor: Cooperative   Speech      Rate / Production: Normal/ Responsive      Volume:  Normal  volume      Language:  intact, no problems and good  Mood:   Normal  Affect:   Appropriate    Thought Content: Clear   Thought Process: Goal Directed       Associations: No loosening of associations  Insight:   Good   Judgment:  Intact   Orientation:  All  Attention/concentration: Good    Rating Scales:    PHQ9:    PHQ-9 SCORE 2/10/2021 3/1/2021   PHQ-9 Total Score MyChart 1 (Minimal depression) 1 (Minimal depression)   Some encounter information is confidential and restricted. Go to Review Flowsheets activity to see all data.   ;    GAD7:    EJNNIE-7 SCORE 2/10/2021 3/1/2021   Total Score 0 (minimal anxiety) 0 (minimal anxiety)   Some encounter information is confidential and restricted. Go to Review Flowsheets activity to see all data.     CGI:      First:Considering your total clinical experience with this particular patient population, how severe are the patient's symptoms at this time?: 1 (3/2/2021  9:15 AM)         Substance Use:  Patient did report a family history of substance use concerns; see medical history section for details.  Patient has not received chemical dependency treatment in the past.  Patient has not ever been to detox.      Patient is not currently receiving any chemical dependency treatment. Patient reported the following problems as a result of their substance use: none reported.    Patient denies using alcohol.  Patient denies using tobacco.  Patient denies using marijuana.  Patient denies using caffeine.  Patient reports using/abusing the following substance(s). Patient reported no other substance use.     CAGE- AID:  No flowsheet data found.    Substance Use: No symptoms    Based on the negative CAGE score and clinical interview there  are not indications of drug or alcohol abuse.      Significant Losses / Trauma / Abuse / Neglect Issues:   Patient did not serve in the .  There are indications or report of significant loss, trauma, abuse or neglect issues related to: death of grandfather a few years ago.  The father of her two eldest children abandoned them (she was 19 years old when she got pregnant with her first son).   Concerns for possible neglect are not present.     Safety Assessment:   Current Safety Concerns:  Todd Suicide Severity Rating Scale (Lifetime/Recent)  Todd Suicide Severity Rating (Lifetime/Recent) 3/2/2021   1. Wish to be Dead (Lifetime) No   1. Wish to be Dead (Recent) No   2. Non-Specific Active Suicidal Thoughts (Lifetime) No   2. Non-Specific Active Suicidal Thoughts (Recent) No   Has subject engaged in non-suicidal self-injurious behavior? (Lifetime) No   Has subject engaged in non-suicidal self-injurious behavior? (Past 3 Months) No       Patient denies current homicidal ideation and  "behaviors.  Patient denies current self-injurious ideation and behaviors.    Patient denied risk behaviors associated with substance use.  Patient denies any high risk behaviors associated with mental health symptoms.  Patient reports the following current concerns for their personal safety: None.  Patient reports there are not  firearms in the house.     History of Safety Concerns:  Patient denied a history of homicidal ideation.     Patient denied a history of personal safety concerns.    Patient denied a history of assaultive behaviors.    Patient denied a history of sexual assault behaviors.     Patient denied a history of risk behaviors associated with substance use.  Patient denies any history of high risk behaviors associated with mental health symptoms.  Patient reports the following protective factors: forward/future oriented thinking, dedication to family/friends, safe and stable environment, effectively controls impulses, living with other people, daily obligations, structured day, effective problem-solving skills, committment to well-being, sense of meaning, positive social skills, healthy fear of risky behaviors or pain, strong sense of self-worth/esteem, sense of personal control or determination and access to a variety of clinical interventions    Risk Plan:  See Recommendations for Safety and Risk Management Plan    Review of Symptoms per patient report:  Depression: Change in appetite  Latrice:  No Symptoms  Psychosis: No Symptoms  Anxiety: No Symptoms  Panic:  No symptoms   Post Traumatic Stress Disorder:  No Symptoms   Eating Disorder: No Symptoms  ADD / ADHD:  No symptoms  Conduct Disorder: No symptoms  Autism Spectrum Disorder: No symptoms  Obsessive Compulsive Disorder: Checking - she does things in 3s (e.g.,checking to make sure curling iron is unplugged; checking the door, the gas stove, etc. before leaving the house). She washes her hair three times. She stated, \"This doesn't affect my day to " "day. This isn't bothersome.\"    Patient reports the following compulsive behaviors and treatment history: none reported.      Diagnostic Criteria:   The client does not report enough symptoms for the full criteria of any specific Anxiety Disorder to have been met   - Excessive anxiety and worry about a number of events or activities (such as work or school performance).     Functional Status:  Patient reports the following functional impairments: health maintenance.     WHODAS:   WHODAS 2.0 Total Score 2/10/2021 3/1/2021   Total Score MyChart 18 17   Some encounter information is confidential and restricted. Go to Review Flowsheets activity to see all data.       Clinical Summary:  1. Reason for assessment: bariatric presurgical evaluation  .  2. Psychosocial, Cultural and Contextual Factors: raised by grandparents.  3. Principal DSM5 Diagnoses  (Sustained by DSM5 Criteria Listed Above):   300.09 (F41.8) Other Specified Anxiety Disorder .  Depression in remission  6. Prognosis: Maintain Current Status / Prevent Deterioration.  7. Likely consequences of symptoms if not treated: NA.  8. Client strengths include:  employed, goal-focused, good listener, insightful, intelligent, motivated, responsible parent, support of family, friends and providers, supportive and wants to learn .     Recommendations:     1. Plan for Safety and Risk Management:   Recommended that patient call 911 or go to the local ED should there be a change in any of these risk factors..          Report to child / adult protection services was NA.      4. Resources/Service Plan:    services are not indicated.   Modifications to assist communication are not indicated.   Additional disability accommodations are not indicated.      5. Collaboration:   Collaboration / coordination of treatment will be initiated with the following  support professionals: primary care physician.      6.  Referrals:   The following referral(s) will be initiated: " outpatient therapy. Next Scheduled Appointment: TBD (Client's PCP is placing an order for this).     A Release of Information has been obtained for the following: NA.    7. DENNY:    DENNY:  Discussed the general effects of drugs and alcohol on health and well-being. Provider gave patient printed information about the effects of chemical use on their health and well being. Recommendations:  NA.     8. Records:   These were reviewed at time of assessment.   Information in this assessment was obtained from the medical record and  provided by patient who is a good historian.    Patient will have open access to their mental health medical record.      Provider Name/ Credentials:  Shirley Stevenson, PhD, LP  March 2, 2021

## 2021-03-02 NOTE — PROGRESS NOTES
"Tasklist updated and sent to patient via [employer].    Bariatric Task List  Status:  Is patient a candidate for bariatric surgery?:  patient is a candidate for bariatric surgery -     Cleared to schedule surgeon consult?:  cleared to schedule surgeon consult - Call [phone] for a \"meet and greet\" pre-surgery appointment to discuss surgery with Dr Gomez. s   Status:  surgery evaluation in process -     Surgeon: Dr. Gomez  -     Tentative surgery month/year: To be determined -        Insurance: Insurance:  [place]  -        Patient Info: Initial Weight:  410 -     Date of Initial Weight/Height:  8/11/2020 -     Goal Weight (lbs):  369 -     Required Weight Loss:  41 -     Surgery Type:  sleeve gastrectomy -        Dietician Visits: Structured weight loss required by insurance?:  no structured weight loss required -     Dietician Visit 1:  Completed - 8/11/20 in Epic. bks   Dietician Visit 2:  Completed - 11/2/20 in Epic. bks   Dietician Visit 3:  Completed - 12/07/20 in Epic. bks   Dietician Visit 4:  Completed - 3/2/21 in Epic. bks   Dietician Visit 5:    -     Dietician Visit 6:    -     Dietician Visit additional:  Needed - As needed to reach pre-surgery goal weight and for postop diet teaching.kbs      Psychological Evaluation: Psych eval:  Needed - Dr Elva huertas. bks   Therapist letter of support:  Needed -        Lab Work: Complete Blood Count:  Completed - 10/15/21 in Epic. bks   Comprehensive Metabolic Panel:  Completed - 10/15/21 in Epic. bks   Vitamin D:  Completed - 10/15/21 in Epic. bks   Hgb A1c:  Completed - 10/15/21 in Epic. bks   PTH:  Completed - 10/15/21 in Epic. bks   TSH:  Completed - 10/15/21 in Epic. bks   Other:  Needed - lipids. Ordered in [facility]. bks      Consults/ Clearance: Medical Weight Management: Needed - Ongoing appts with Cierra Caballero CNP. Call [phone] to schedule. bks      Testing: EGD:  Needed -        PCP: Establish care with PCP:  Completed -     Follow up with PCP: " "   -     PCP letter of support:  Completed - 10/19/20 Kayla Arcos PA-C ltr in [facility] section scanned on 10/21/20.      Patient Education:  Information Session:  Completed -     Given \"Making your decision\" handout?:  Given \"\"Making your decision\"\" handout? -     Given support group information?:  support group contact information provided -     Attended support group?:    -     Support plan in place?:  Completed -        Final Tasks:  Before surgery online class:  Needed -     Before surgery online class website link:  [email or web address]   After surgery online class:  Needed -     After surgery online class website link:  [email or web address]   Nurse visit for weigh-in and information:  Needed -     Pre-assessment clinic visit with anesthesia team for H&P:  Needed -     Final labs (Hgb, plt, T&S, UA):  Needed -        Notes: Please register for the Get Well Loop when you get an email invitation and a surgery date.     The Get Well Loop will give you information via email or text messages that can help you be more successful before and after surgery.  It can also help ans,wer any questions you may have.  Get Well Loop Information  [email or web address]         "

## 2021-03-02 NOTE — PROGRESS NOTES
"Grecia Lau is a 35 year old who is being evaluated via a billable telephone visit.     The patient has been notified of following:     \"This telephone visit will be conducted via a call between you and your physician/provider. We have found that certain health care needs can be provided without the need for a physical exam.  This service lets us provide the care you need with a short phone conversation.  If a prescription is necessary we can send it directly to your pharmacy.  If lab work is needed we can place an order for that and you can then stop by our lab to have the test done at a later time.    Telephone visits are billed at different rates depending on your insurance coverage. During this emergency period, for some insurers they may be billed the same as an in-person visit.  Please reach out to your insurance provider with any questions.    If during the course of the call the physician/provider feels a telephone visit is not appropriate, you will not be charged for this service.\"    Patient has given verbal consent for Telephone visit?  Yes    What phone number would you like to be contacted at? [phone]    How would you like to obtain your AVS? MyChart    Phone call duration: 23 minutes    During this virtual visit the patient is located in [place], patient verifies this as the location during the entirety of this visit.     Phone Start Time: 8:38 AM  Phone End Time: 9:01 AM      Return Bariatric Nutrition Consultation Note    Reason For Visit: Nutrition Assessment    Grecia LAU is a 35 year old presenting today for follow-up bariatric nutrition consult.   Pt is interested in laparoscopic sleeve gastrectomy with Dr. Gomez expected surgery in [facility].  Patient is accompanied by self.  This is pt's 4th of 3 required nutrition visits prior to surgery.     Pt referred by Cierra Caballero NP on August 11, 2020.  Patient with Co-morbidities of obesity including:  Type II DM no  Renal Failure no  Sleep " "apnea no  Hypertension no   Dyslipidemia no  Joint pain no  Back pain no  GERD yes     Support System Reviewed With Patient 8/6/2020   Who do you have in your support network that can be available to help you for the first 2 weeks after surgery? Yes.   Who can you count on for support throughout your weight loss surgery journey? Yes.       ANTHROPOMETRICS:  Estimated body mass index is 66.18 kg/m  as calculated from the following:    Height as of 12/7/20: 1.676 m (5' 6\").    Weight as of 8/11/20: 186 kg (410 lb).   No recent weight--> Anxiexty about getting on the scale     Required weight loss goal pre-op: 41 lbs from initial consult weight (goal weight 369 lbs or less before surgery)       8/6/2020   I have tried the following methods to lose weight Watching portions or calories, Exercise, Slimfast       Weight Loss Questions Reviewed With Patient 8/6/2020   How long have you been overweight? 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   At home she has 3 kids and a , and feel like she has no time for herself.     She has previously lost weight with meal prepping but it is not always sustainable. She grabs and goes during the work day, some day she does not eat until diner time. Will snack before and after dinner.      Today: Pt reports she feels amazing today, was really sick 2-3 week: thinking she had COVID ( no positive test, got tested really late). Her PCP believes she had COVID per pt's report.     She was unaware she has her psych evaluation at 9 am today. She is frustrated that she did not know she has her evaluation.     Discussed the nest steps for surgery, she is very anxious about surgery and would like to see health psychology prior to surgery even if it pushes her surgery date back, she would like " "to be in the right head space. Her anxiety is holding her back from getting on the scale, she feels she is losing weight, down a size in leggings but still is fearful of the scale.   Discussed other determinates of health besides weight, she said she felt \"amazing\" earlier in the visit, disucssed mood and labs and being able to move easier.      Diet Recall: per last RD visit    Shake   Meal prep for lunch: salad with chicken,   Dinner: Meat, starch vegetables or whatever family makes.     Progress Towards Previous Goals:  Relating To Eatin. Eat slowly (20-30 minutes per meal), chewing foods well (25 chews per bite/applesauce consistency)-Did not discuss today   2. 9\" Plate method (1/2 plate non-starchy vegetables/fruit, 1/4 plate lean protein, 1/4 plate whole grain starch - no more than 1/2 cup carb/meal)-Did not discuss today   3. Eat 3 meals per day-Did not discuss today   - Breakfast try a protein shake as discussed, try blending it, okay to add 1/2 a banana or 1/2 cup of fruit.     *Protein Shake Criteria: no more than 210 Calories, at least 20 grams of protein, and less than 10 grams of sugar     4. Continues prepping lunches -Did not discuss today     Relating to beverages:  Continues to avoid caffeine/carbonation/calorie containing beverages-Did not discuss today   Separate fluids from meals by 30 minutes before, during, and after eatingDid not discuss today     Relating to dietary supplements:  Continues with  multivitamin containing iron daily Did not discuss today     Continues Walk/exercise for 15-30 minutes daily-Did not discuss today       Eating Habits 2020   Do you have any dietary restrictions? No   Do you currently binge eat (eat a large amount of food in a short time)? Yes   Are you an emotional eater? Yes   Do you get up to eat after falling asleep? No   What foods do you crave? Junk.       ADDITIONAL INFORMATION:    Dining Out History Reviewed With Patient 2020   How often do you " "dine out? Rarely.   Where do you dine out? (select all that apply) take out   What types of food do you order when you dine out? Chicken Sand's, fries       Physical Activity Reviewed With Patient 2020   How often do you exercise? 3 to 4 times per week   What is the duration of your exercise (in minutes)? 30 Minutes   What types of exercise do you do? walking   What keeps you from being more active?  Pain, Shortness of breath, Worried people will look at me       NUTRITION DIAGNOSIS:  Obesity r/t long history of self-monitoring deficit and excessive energy intake aeb BMI >30 kg/m2.    INTERVENTION:  Intervention Provided/Education:   1. Provided encouragement and support.   2. Briefly Reviewed post-op diet guidelines, GI anatomy of bariatric surgeries, ways to help prepare for post-op diet guidelines pre-operatively, portion/calorie-control, mindful eating and sources of protein.   3. Dicussed tasklist and next step for surgery    4. Dicussed anxiety and referral placed to health psychology.   5. AVS via Appseet     Questions Reviewed With Patient 2020   How ready are you to make changes regarding your weight? Number 1 = Not ready at all to make changes up to 10 = very ready. 10   How confident are you that you can change? 1 = Not confident that you will be successful making changes up to 10 = very confident. 10       Patient Understanding: good  Expected Compliance: good    GOALS:  Relating To Eatin. Eat slowly (20-30 minutes per meal), chewing foods well (25 chews per bite/applesauce consistency)  2. 9\" Plate method (1/2 plate non-starchy vegetables/fruit, 1/4 plate lean protein, 1/4 plate whole grain starch - no more than 1/2 cup carb/meal)  3. Eat 3 meals per day  - Breakfast try a protein shake as discussed, try blending it, okay to add 1/2 a banana or 1/2 cup of fruit.     *Protein Shake Criteria: no more than 210 Calories, at least 20 grams of protein, and less than 10 grams of sugar     4. " Continues prepping lunches     Relating to beverages:  Continues to avoid caffeine/carbonation/calorie containing beverages  Separate fluids from meals by 30 minutes before, during, and after eating    Relating to dietary supplements:  Continues with  multivitamin containing iron daily     Continues Walk/exercise for 15-30 minutes daily    Health Psychologist and Counseling center: Referral placed. Please call to set up appointment    [facility]   Page Sher, PhD,           [phone]  Kirstyn Lyman PhD (cannot accept Medicare) [phone]  Ashwin Travis: [phone]  Corina Anderson: [phone]  Alex Pandya: [phone]    [facility]: [phone]      Time spent with patient: 23 minutes.  Halie Rosado, MS, RD, LD

## 2021-03-08 ENCOUNTER — VIRTUAL VISIT (OUTPATIENT)
Dept: ENDOCRINOLOGY | Facility: CLINIC | Age: 36
End: 2021-03-08
Payer: COMMERCIAL

## 2021-03-08 VITALS — BODY MASS INDEX: 64.22 KG/M2 | HEIGHT: 67 IN

## 2021-03-08 DIAGNOSIS — F41.8 DEPRESSION WITH ANXIETY: ICD-10-CM

## 2021-03-08 DIAGNOSIS — E66.01 CLASS 3 SEVERE OBESITY DUE TO EXCESS CALORIES WITH SERIOUS COMORBIDITY AND BODY MASS INDEX (BMI) OF 60.0 TO 69.9 IN ADULT (H): Primary | ICD-10-CM

## 2021-03-08 DIAGNOSIS — E66.813 CLASS 3 SEVERE OBESITY DUE TO EXCESS CALORIES WITH SERIOUS COMORBIDITY AND BODY MASS INDEX (BMI) OF 60.0 TO 69.9 IN ADULT (H): Primary | ICD-10-CM

## 2021-03-08 DIAGNOSIS — K21.9 GASTROESOPHAGEAL REFLUX DISEASE WITHOUT ESOPHAGITIS: ICD-10-CM

## 2021-03-08 PROCEDURE — 99214 OFFICE O/P EST MOD 30 MIN: CPT | Mod: 95 | Performed by: NURSE PRACTITIONER

## 2021-03-08 RX ORDER — SERTRALINE HYDROCHLORIDE 100 MG/1
100 TABLET, FILM COATED ORAL DAILY
COMMUNITY

## 2021-03-08 ASSESSMENT — PAIN SCALES - GENERAL: PAINLEVEL: NO PAIN (0)

## 2021-03-08 NOTE — ASSESSMENT & PLAN NOTE
Removed from taslist. Patient notes symptoms have completely resolved with diet and lifestyle changes. No issues. Does not use any medication for this.

## 2021-03-08 NOTE — PROGRESS NOTES
Grecia is a 35 year old who is being evaluated via a billable video visit.      How would you like to obtain your AVS? MyChart  If the video visit is dropped, the invitation should be resent by: Send to e-mail at: [email or web address]  Will anyone else be joining your video visit? No      Video Start Time: 0945      Video-Visit Details    Type of service:  Video Visit    Video End Time:1014    Originating Location (pt. Location): Home    Distant Location (provider location):  [facility] [place]     Platform used for Video Visit: [employer] [place]

## 2021-03-08 NOTE — PROGRESS NOTES
Pre-Bariatric Surgery Note    Kayla Arcos    Date: 3/8/2021     RE: Grecia MORALEZ    MR#: [id number]   : 1985   Date of Visit: Mar 8, 2021    REASON FOR VISIT: Preoperative evaluation for possible weight loss surgery    Dear Kayla Abbott,    I had the pleasure of seeing your patient, Grecia MORALEZ, in my preoperative bariatric clinic.    As you know, she is morbidly obese and considering weight loss surgery to treat obesity in association with her medical conditions of obesity.  Her consult weight was 410.  She has lost ? pounds since her consult weight. She has not met her required pre-surgery weight. Please refer to initial consult note from date 2020 for patient's weight history and co-morbidities. 369lb day of surgery     Last seen 2020   -no medication changes, had been restarted on bupropion and sertraline for mood and was feeling good. Was making postive progress towards goal. Had considered naltrexone but opted against it. Didn't tolerate topiramate, ozempic and saxenday not covered by insurance.     - family deaths     January- suspected covid infection, known contact     Non-scale victories- can see jaw line, has gone down 2 pant sizes, has moved her watch down two notches. Less thoughts about food, hunger, cravings with changes she has made with dietitian. - hasn't weighed- doesn't want to get discouraged, thrives better with non-scale victories continues to work with Halie with that     Psych eval: started, follow up scheduled Dr. Stevenson     Reflux: resolved with diet change, no longer needing any medication for this. No longer needing EGD for surgery pre-req     Reviewed current medications and should be able to continue all post op without any adjustments.       Assessment & Plan   Problem List Items Addressed This Visit        Digestive    Gastroesophageal reflux disease     Removed from taslist. Patient notes symptoms have completely resolved with  "diet and lifestyle changes. No issues. Does not use any medication for this.          Class 3 severe obesity due to excess calories with serious comorbidity and body mass index (BMI) of 60.0 to 69.9 in adult (H) - Primary     Continues to make good progress with diet and lifestyle as evidence by non-scale victories. Has plans to schedule with therapist. Lengthy discussion about surgery process itself.             Behavioral    Depression with anxiety     Stable. Working on scheduling with therapist. Psych eval scheduled and in process.          Relevant Medications    sertraline (ZOLOFT) 100 MG tablet        33 minutes spent on the date of the encounter doing chart review, history and exam, documentation and further activities as noted above  0956}  MEDICATIONS:  Continue current medications without change  FUTURE APPOINTMENTS:       - Follow-up visit in 3 mons        - continue working with Halie        - Follow up with Dr. Gomez        - Schedule with therapist     Reviewed tasklist with patient     Most recent weights:  Wt Readings from Last 4 Encounters:   20 (!) 186 kg (410 lb)         ROS    No past medical history on file.    Past Surgical History:   Procedure Laterality Date     AS REMOVAL OF OVARIAN CYST(S)        SECTION      x2       Current Outpatient Medications   Medication     ALPRAZolam (XANAX) 0.25 MG tablet     buPROPion (WELLBUTRIN SR) 150 MG 12 hr tablet     PROAIR  (90 Base) MCG/ACT inhaler     sertraline (ZOLOFT) 100 MG tablet     No current facility-administered medications for this visit.        Allergies   Allergen Reactions     Latex Rash       No results found for any previous visit.       PHYSICAL EXAM:  Objective    Ht 1.702 m (5' 7\")   BMI 64.22 kg/m    Vitals - Patient Reported  Pain Score: No Pain (0)      Vitals:  No vitals were obtained today due to virtual visit.    Physical Exam   GENERAL: Healthy, alert and no distress  EYES: Eyes grossly normal to " inspection.  No discharge or erythema, or obvious scleral/conjunctival abnormalities.  RESP: No audible wheeze, cough, or visible cyanosis.  No visible retractions or increased work of breathing.    SKIN: Visible skin clear. No significant rash, abnormal pigmentation or lesions.  NEURO: Cranial nerves grossly intact.  Mentation and speech appropriate for age.  PSYCH: Mentation appears normal, affect normal/bright, judgement and insight intact, normal speech and appearance well-groomed.    Sleeve Gastrectomy: Risks and Side Effects    The complications or risks of surgery include but are not limited to: death, heart attack, infection in the surgical site (wound infection), abdomen (abscess), bladder (urinary tract infection), lungs (pneumonia), clots in legs (deep vein thrombosis) or lungs (pulmonary emboli),  injury to the bowels or other organs, bowel obstruction, hernia at the incision and gastrointestional bleeding.    More specific risks related to vertical sleeve gastrectomy were detailed at the bariatric informational seminar and include the following: leak at the vertical sleeve staple line, leak at the anastomoses,  nausea, vomiting, and dehydration for several months,  adhesions causing bowel obstruction, rapid weight loss causing a higher rate of gallstone formation during the first 6 months after surgery, decreased absorption of vitamins and protein because of the reduced stomach size, weight regain if inappropriate food intake occurs, stricture, injury to other organs, hernia,  and ulcers.       Side effects of bariatric surgery include but are not limited to: abdominal pain, cramping, bloating, constipation, nausea, vomiting, diarrhea, difficulty swallowing,  dehydration, hair loss, excess skin, protein, iron and vitamin deficiencies, heartburn, transfer of addictions, increased anxiety and worsening depression.       I emphasized exercise and activity behavior along with appropriate food choice as the  main foundation for weight loss with surgery providing surgical reinforcement of the appropriate behavior set.        Review of general surgery weight loss process    1. Complete preoperative requirements, including weight loss.  Final weight check to confirm MANDATORY weight loss requirement must be documented on a clinic scale.    2. Discuss prior authorization with .    3. History and physical evaluation by PCP of PAC clinic within 30 days of surgery date, preoperative class, and weight check (weigh-in visit) to be scheduled by patient.  Pre-anesthesia clinic for risk evaluation to be scheduled by anesthesia clinic.    4. We cannot guarantee that patient will qualify for surgery unless all preoperative requirements are met, prior authorization from primary insurance company is granted, and insurance changes do not occur.    5. It is possible for patients to regain all weight after weight loss surgery unless they follow guidelines prescribed by our bariatric center.    6. All patients with gastrointestinal complaints after weight loss surgery must have complaints conveyed to the bariatric team for appropriate treatment.    7. Vitamin deficiencies may develop post-bariatric surgery and annual laboratory testing should be performed.    8. Persistent nausea/vomiting after bariatric surgery entails risk of thiamine deficiency and should be treated early.  Vitamin B12 deficiency may develop, especially after gastric bypass surgery and must be recognized.        If you have any questions about our plans please don't hesitate to contact me.    Sincerely,    Cierra Caballero NP

## 2021-03-08 NOTE — LETTER
3/8/2021       RE: Grecia MORALEZ  [address] [facility] [address]     Dear Colleague,    Thank you for referring your patient, Grecia MORALEZ, to the [facility] [place] at [facility]. Please see a copy of my visit note below.      Pre-Bariatric Surgery Note    Kayla Arcos    Date: 3/8/2021     RE: Grecia MORALEZ    MR#: [id number]   : 1985   Date of Visit: Mar 8, 2021    REASON FOR VISIT: Preoperative evaluation for possible weight loss surgery    Dear Kayla Abbott,    I had the pleasure of seeing your patient, Grecia MORALEZ, in my preoperative bariatric clinic.    As you know, she is morbidly obese and considering weight loss surgery to treat obesity in association with her medical conditions of obesity.  Her consult weight was 410.  She has lost ? pounds since her consult weight. She has not met her required pre-surgery weight. Please refer to initial consult note from date 2020 for patient's weight history and co-morbidities. 369lb day of surgery     Last seen 2020   -no medication changes, had been restarted on bupropion and sertraline for mood and was feeling good. Was making postive progress towards goal. Had considered naltrexone but opted against it. Didn't tolerate topiramate, ozempic and saxenday not covered by insurance.     - family deaths     January- suspected covid infection, known contact     Non-scale victories- can see jaw line, has gone down 2 pant sizes, has moved her watch down two notches. Less thoughts about food, hunger, cravings with changes she has made with dietitian. - hasn't weighed- doesn't want to get discouraged, thrives better with non-scale victories continues to work with Halie with that     Psych eval: started, follow up scheduled Dr. Stevenson     Reflux: resolved with diet change, no longer needing any medication for this. No longer  needing EGD for surgery pre-req     Reviewed current medications and should be able to continue all post op without any adjustments.       Assessment & Plan   Problem List Items Addressed This Visit        Digestive    Gastroesophageal reflux disease     Removed from taslist. Patient notes symptoms have completely resolved with diet and lifestyle changes. No issues. Does not use any medication for this.          Class 3 severe obesity due to excess calories with serious comorbidity and body mass index (BMI) of 60.0 to 69.9 in adult (H) - Primary     Continues to make good progress with diet and lifestyle as evidence by non-scale victories. Has plans to schedule with therapist. Lengthy discussion about surgery process itself.             Behavioral    Depression with anxiety     Stable. Working on scheduling with therapist. Psych eval scheduled and in process.          Relevant Medications    sertraline (ZOLOFT) 100 MG tablet        33 minutes spent on the date of the encounter doing chart review, history and exam, documentation and further activities as noted above  0956}  MEDICATIONS:  Continue current medications without change  FUTURE APPOINTMENTS:       - Follow-up visit in 3 mons        - continue working with Halie        - Follow up with Dr. Gomez        - Schedule with therapist     Reviewed tasklist with patient     Most recent weights:  Wt Readings from Last 4 Encounters:   20 (!) 186 kg (410 lb)         ROS    No past medical history on file.    Past Surgical History:   Procedure Laterality Date     AS REMOVAL OF OVARIAN CYST(S)        SECTION      x2       Current Outpatient Medications   Medication     ALPRAZolam (XANAX) 0.25 MG tablet     buPROPion (WELLBUTRIN SR) 150 MG 12 hr tablet     PROAIR  (90 Base) MCG/ACT inhaler     sertraline (ZOLOFT) 100 MG tablet     No current facility-administered medications for this visit.        Allergies   Allergen Reactions     Latex Rash  "      No results found for any previous visit.       PHYSICAL EXAM:  Objective    Ht 1.702 m (5' 7\")   BMI 64.22 kg/m    Vitals - Patient Reported  Pain Score: No Pain (0)      Vitals:  No vitals were obtained today due to virtual visit.    Physical Exam   GENERAL: Healthy, alert and no distress  EYES: Eyes grossly normal to inspection.  No discharge or erythema, or obvious scleral/conjunctival abnormalities.  RESP: No audible wheeze, cough, or visible cyanosis.  No visible retractions or increased work of breathing.    SKIN: Visible skin clear. No significant rash, abnormal pigmentation or lesions.  NEURO: Cranial nerves grossly intact.  Mentation and speech appropriate for age.  PSYCH: Mentation appears normal, affect normal/bright, judgement and insight intact, normal speech and appearance well-groomed.    Sleeve Gastrectomy: Risks and Side Effects    The complications or risks of surgery include but are not limited to: death, heart attack, infection in the surgical site (wound infection), abdomen (abscess), bladder (urinary tract infection), lungs (pneumonia), clots in legs (deep vein thrombosis) or lungs (pulmonary emboli),  injury to the bowels or other organs, bowel obstruction, hernia at the incision and gastrointestional bleeding.    More specific risks related to vertical sleeve gastrectomy were detailed at the bariatric informational seminar and include the following: leak at the vertical sleeve staple line, leak at the anastomoses,  nausea, vomiting, and dehydration for several months,  adhesions causing bowel obstruction, rapid weight loss causing a higher rate of gallstone formation during the first 6 months after surgery, decreased absorption of vitamins and protein because of the reduced stomach size, weight regain if inappropriate food intake occurs, stricture, injury to other organs, hernia,  and ulcers.       Side effects of bariatric surgery include but are not limited to: abdominal pain, " cramping, bloating, constipation, nausea, vomiting, diarrhea, difficulty swallowing,  dehydration, hair loss, excess skin, protein, iron and vitamin deficiencies, heartburn, transfer of addictions, increased anxiety and worsening depression.       I emphasized exercise and activity behavior along with appropriate food choice as the main foundation for weight loss with surgery providing surgical reinforcement of the appropriate behavior set.        Review of general surgery weight loss process    1. Complete preoperative requirements, including weight loss.  Final weight check to confirm MANDATORY weight loss requirement must be documented on a clinic scale.    2. Discuss prior authorization with .    3. History and physical evaluation by PCP of PAC clinic within 30 days of surgery date, preoperative class, and weight check (weigh-in visit) to be scheduled by patient.  Pre-anesthesia clinic for risk evaluation to be scheduled by anesthesia clinic.    4. We cannot guarantee that patient will qualify for surgery unless all preoperative requirements are met, prior authorization from primary insurance company is granted, and insurance changes do not occur.    5. It is possible for patients to regain all weight after weight loss surgery unless they follow guidelines prescribed by our bariatric center.    6. All patients with gastrointestinal complaints after weight loss surgery must have complaints conveyed to the bariatric team for appropriate treatment.    7. Vitamin deficiencies may develop post-bariatric surgery and annual laboratory testing should be performed.    8. Persistent nausea/vomiting after bariatric surgery entails risk of thiamine deficiency and should be treated early.  Vitamin B12 deficiency may develop, especially after gastric bypass surgery and must be recognized.        If you have any questions about our plans please don't hesitate to contact me.    Sincerely,    Cierra Caballero,  BLAS Paige is a 35 year old who is being evaluated via a billable video visit.      How would you like to obtain your AVS? MyChart  If the video visit is dropped, the invitation should be resent by: Send to e-mail at: [email or web address]  Will anyone else be joining your video visit? No      Video Start Time: 0945      Video-Visit Details    Type of service:  Video Visit    Video End Time:1014    Originating Location (pt. Location): Home    Distant Location (provider location):  [facility] [place]     Platform used for Video Visit: Stockleap

## 2021-03-08 NOTE — ASSESSMENT & PLAN NOTE
Continues to make good progress with diet and lifestyle as evidence by non-scale victories. Has plans to schedule with therapist. Lengthy discussion about surgery process itself.

## 2021-03-08 NOTE — NURSING NOTE
"Chief Complaint   Patient presents with     Pre-Op Exam     Virtual pre op       Vitals:    03/08/21 0919   Height: 1.702 m (5' 7\")       Body mass index is 64.22 kg/m .      MELISSA Whelan NREMT                      "

## 2021-03-08 NOTE — PATIENT INSTRUCTIONS
"It was a pleasure meeting with you today.    Thank you for allowing us the privilege of caring for you. We hope we provided you with the excellent service you deserve.   Please let us know if there is anything else we can do for you so that we can be sure you are leaving completely satisfied with your care experience.    To ensure the quality of our services you may be receiving a patient satisfaction survey from an independent patient satisfaction monitoring company.    The greatest compliment you can give is a \"Likely to Recommend\"      Your visit was with Cierra Caballero NP today.     Instructions per today's visit:     -Keep up the great work!     0956}  MEDICATIONS:  Continue current medications without change  FUTURE APPOINTMENTS:       - Follow-up visit in 3 monhts        - continue working with Halie        - Follow up with Dr. Gomez        - Schedule with therapist       To schedule appointments with our team, please call [phone] option #1    Please call during clinic hours Monday through Friday 8:00a - 4:00p if you have questions or you can contact us via [employer] at anytime.      Nurses: [phone]  Fax: [phone]  Surgery Scheduler: [phone]    Please call the hospital at [phone] to speak with our on call MDs if you have urgent needs after hours, during weekends, or holidays.    **Please note if you need to go to the Emergency Room for any reason in the first 90 days after surgery it is important to go to the [facility] ER on the [place] on [facility] [facility] off of the [place] [address] [place] [address] off [address]    *For patients who are currently enrolled in our program and have not yet had weight loss surgery please note*:    You must be at your required goal weight at the time of your Anesthesia clinic visit as well as the day of surgery or your surgery will be canceled. You will not be able to obtain a new surgery date until you have met your goal weight.    Lab results will be communicated " through My Chart or letter (if My Chart not used). Please call the clinic if you have not received communication after 1 week or if you have any questions.?    Main follow-up parameters for all bariatric patients     Patients who have undergone Bariatric surgery:    1. Follow-up interval during the first year: ~1 week, 1 month, 3 month, 6 month,12 months.  Every 6 months until 5 years; and then annually thereafter.  The goal of follow-up sessions is to ensure that food intake behavior (choice of food and eating speed) and exercise/activity level are set appropriately.    2. Yearly lab tests ordered by our clinic.      3. The bariatric team should be aware and potentially evaluate all adverse gastrointestinal symptoms (dysphagia, abdominal pain, nausea, vomiting, diarrhea, heartburn, reflux, etc), which can be a sign of complication.  The bariatric surgeons perform general surgery procedures in addition to bariatric surgery (laparoscopic cholecystectomy, etc.)    4. Inability to tolerate textured food (chicken, steak, fish, eggs, beans) is NOT normal and may need to be evaluated by endoscopy performed by the bariatric surgeon.    _____________________________________________________________________________________________________________________________    Meal Replacement Products:    Here is the link to our new e-store where you can purchase our meal replacement products    [facility] E-Store  [email or web address]/store    The one week starter kit is a great way to sample a variety of products and see what works for you.    If you want more information about the product go to: [employer]    Free Shipping for orders over $75     Benefits of meal replacements products:    Portion and calorie control  Improved nutrition  Structured eating  Simplified food choices  Avoid contact with trigger  foods  ______________________________________________________________________________________________________________________________    Healthy Lifestyle Support Group   [facility]  Virtual Support Group Now Available    60 minutes of small group guided discussion, support and resources    Led by Health , Cristy Harden    For questions, and to receive the invite information, contact Cristy at [email or web address]    All our welcome!    One Friday per month, 12:30pm to 1:30pm  SELF COMPASSION: July 31st  CREATING MY WELLNESS VISION: August 28th  MINDFULNESS PRACTICES FOR A CALM BODY & MIND: September 25th  MINDFUL EATING TOOLS: October 30th  HEALTHY& HAPPY HOLIDAYS: November 20th  OPEN FORUM: December 18th  _______________________________________________________________________________________________________________________________    Connections: Bariatric Care support group  Due to the Covid-19 restrictions, we will be doing our support group virtually using Microsoft Teams. You will need to get an invitation to the group from Jonny Bonilla, Ph.D., LP at [email or web address] and to learn about using Microsoft Teams. The next meeting is on Tuesday, July 14 between 6:30 and 8 PM and there will be no formal speaker.    This group meets the second Tuesday of each month from 6:30 to 8 PM and will be held again at [facility] in the  [address] [place] (A-B room) when the Covid-19 restrictions are lifted. The group is led by Jonny Bonilla, Ph.D., Licensed Psychologist, [facility]. There is no cost for group participation and is open to all [facility] (and those external to this program) pre- and post-operative bariatric surgery patients as well as their support system.    _________________________________________________________________________________________________________________________________      Interested in working with a health ?  Health coaches work with you to improve your overall health and wellbeing.  They look at the whole person, and may involve discussion of different areas of life, including, but not limited to the four pillars of health (sleep, exercise, nutrition, and stress management). Discuss with your care team if you would like to start working a health .     Health Coaching-3 Pack:      $99 for three health coaching visits    Visits may be done in person or via phone    Coaching is a partnership between the  and the client; Coaches do not prescribe or diagnose    Coaching helps inspire the client to reach his/her personal goals   __________________________________________________________________________________________________________________________________    [facility] Get Moving Program    Our team of physical therapists is trained to help you understand and take control of your condition. They will perform a thorough evaluation to determine your ability for activity and develop a customized plan to fit your goals and physical ability.  Scheduling: Unsure if the Get Moving program is right for you? Discuss the program with your medical provider or diabetes educator. You can also call us at [phone] to ask questions or schedule an appointment.   [facility] Get Moving Program  ______________________________________________________________________________________________________________________  Bluetooth Scale:    We hope to provide you with high quality telephone and virtual healthcare visits while social distancing for COVID-19 is necessary, as well as in the future when virtual visits may be more convenient for you.     Our technology team made it possible for [employer] scales to send weight measurements to our  electronic medical record. This allows weights from you weighing at home to securely flow into the medical record, which will improve telephone and virtual visits.   Additionally, studies have shown that adults actually lose more weight when their weights are automatically sent to someone else, and also that this process is not stressful for those adults.    Below is a link for purchasing the scale, with a discount code for our patients. You may call your insurance company to see if they will reimburse you for the cost of the scale, as a piece of durable medical equipment. The scales only go up to a weight of 400 pounds. This is an issue and we are working with the developer on increasing this. We found no scales that go over 400lb that have blue-tooth for connecting to [employer].    Scale to purchase: the Rezdy  Body  Scale: [email or web address]=aw.ds    Discount Code: We have a discount code for our patients to bring the cost down to $50, the code is: [place]_Scale_20%off    Steps to link the scale to [employer] via an Android Phone (you can always disconnect at any point in the future):  1. The order must be placed first before the patient can access Track My Health within [employer].  2. Download Google Fit ede from the Google Play Store   a. Log in or register using your Google account   3. Download the [employer]az ede from Google Play Store  a. Select add organization   b. Search for [employer] and select it   c. Log into [employer]  d. Select Track My Health   e. Select the green connect my account button   f. When prompted log into your Google account   g. Select okay to confirm the account   4. Download the [employer] Health Mate ede from Google Play Store  a. [place] for [employer]   b. Go to profile   c. Tap google fit under the Apps section  d. Select the option to activate Google Fit integration   e. Select the same Google  account   f. Select okay to confirm the account  g.   Steps to link the scale to [employer] via an iPhone (you can always disconnect at any point in the future):  **Note Lolapps is not available for download on an iPad**  1. The order must be placed first before the patient can access Track My Health within [employer].  2. Locate the Health tremayne on your iPhone.  a. Set up your Apple Health account as prompted  b. The Sources page will show Apps that communicate with your Health tremayne. Once all steps are completed, you should see [email or web address] and [employer]st listed under the Apps section and your iPhone under the devices section.  i. Select Health Mate  1. Under 'ALLOW  [employer]  TO WRITE DATA ensure the toggle is on for Weight.  2. This will allow the scale to add your weight to the Apple Health  ii. Select MyChart  1. Under 'ALLOW  [employer]  TO READ DATA ensure the toggle is on for Weight.  2. This allows [employer] to grab the weight from [employer] so your provider can see your weights.  3. Download the ResolutionTube tremayne from the Tremayne Store   a. Select add organization                                                  b. Search for [employer] and select it  c. Log into ResolutionTube  d. Select Track My Health   e. Select the green connect my account button   f. Follow prompts to link your device to [employer]Tube.  4. Download the Withings health mate tremayne in the Tremayne Store   a. [place] for [employer]   b. Go to profile   c. [employer] [facility] under the Apps section  d. If prompted to allow access with the Health Tremayne, toggle weight on for read and write access.

## 2021-03-10 ENCOUNTER — DOCUMENTATION ONLY (OUTPATIENT)
Dept: PSYCHOLOGY | Facility: CLINIC | Age: 36
End: 2021-03-10

## 2021-03-10 NOTE — PROGRESS NOTES
Client Name: Grecia Lau   MRN: [id number]  : 1985    Client completed the [place] Multiphasic Personality Inventory-2 (MMPI-2), a self-report personality inventory, as part of her evaluation. Validity scales indicate that the client responded in a manner to appear virtuous for purposes of impression management. The following results are likely to be an accurate reflection of client's current functioning. Client s responses suggest that she is reporting low levels of general emotional distress. Individuals with similar profiles are likely easily frightened and experience anxiety or can feel phobic about certain situations or items. They may seek to avoid others (both individuals and groups) because they feel uneasy or awkward in such situations and because they say they are happier being alone.

## 2021-03-16 ENCOUNTER — FCC EXTENDED DOCUMENTATION (OUTPATIENT)
Dept: PSYCHOLOGY | Facility: CLINIC | Age: 36
End: 2021-03-16

## 2021-03-17 ENCOUNTER — VIRTUAL VISIT (OUTPATIENT)
Dept: PSYCHOLOGY | Facility: CLINIC | Age: 36
End: 2021-03-17
Payer: COMMERCIAL

## 2021-03-17 DIAGNOSIS — F41.9 ANXIETY: Primary | ICD-10-CM

## 2021-03-17 PROCEDURE — 90832 PSYTX W PT 30 MINUTES: CPT | Mod: 95 | Performed by: PSYCHOLOGIST

## 2021-03-17 NOTE — PROGRESS NOTES
"Progress Note for Bariatric Pre-Surgical Psychological Assessment    Patient Name: Grecia Lau Date: 3/17/2021        Service Type: Individual  Video Visit:  No - Phone     Session Start Time: 11:00  Session End Time: 11:20     Session Length: 20 minutes    Session #: 2    Attendees: Client attended alone     The patient has been notified of the following:      \"We have found that certain health care needs can be provided without the need for a face to face visit.  This service lets us provide the care you need with a phone conversation.       I will have full access to your [facility] medical record during this entire phone call.   I will be taking notes for your medical record.      Since this is like an office visit, we will bill your insurance company for this service.       There are potential benefits and risks of telephone visits (e.g. limits to patient confidentiality) that differ from in-person visits.? Confidentiality still applies for telephone services, and nobody will record the visit.  It is important to be in a quiet, private space that is free of distractions (including cell phone or other devices) during the visit.??      If during the course of the call I believe a telephone visit is not appropriate, you will not be charged for this service\"     Consent has been obtained for this service by care team member: Yes       DATA  Interactive Complexity: No  Crisis: No       Progress Since Last Session (Related to Symptoms / Goals / Homework):   Symptoms: No change reported    Homework: Not previously assigned      Episode of Care Goals: Minimal progress - PREPARATION (Decided to change - considering how); Intervened by negotiating a change plan and determining options / strategies for behavior change, identifying triggers, exploring social supports, and working towards setting a date to begin behavior change     Current / Ongoing Stressors and Concerns:   Health Concerns (obesity, GERD)   Busy " "Schedule with little free time   Difficulty with insomnia and energy     Treatment Objective(s) Addressed in This Session:   Completed questions about weight management history and concerns and benefits of bariatric surgery.     Current status: The client reported she has struggled with weight since childhood (around age 10/11). She reported that she ate what she wanted when she wanted. She was pretty active and played outside and was in sports. She noted that she gained weight with each pregnancy (3) and it was harder to lose weight each time.    Diets and exercise: She has tried losing weight in the past by dieting (these work for a few days and then she loses motivation). She stated, \"Maybe I haven't tried hard enough.\" She has done Atkins, low calorie and low carb diets. She stated, \"I didn't really know how to diet. I just thought that I shouldn't eat. I didn't know there was a science behind it.\" The most weight she has lost was 5-10 pounds by counting calories and exercising. She didn't feel comfortable going to the gym so she tried to work out at home. The lat few years have been busy at work \"and I've lost sight of everything.\" Now that things are slowing down I can focus on me again. She reported she has struggled with overeating at meal times, skipping meals, snacking, eating late at night (\"eating whatever I want and not having the time to focus\"). She tends to eat \"whatever is available\" and doesn't prefer sweets or salty foods. Client was eating out 3-5 times per month and now it is down to once per month. She has made more of an effort to do meal planning as of late. She struggles with having enough time to prepare healthy meals (working, 3 kids, etc.).       Type of Surgery:  The client reported that she has been thinking about bariatric surgery for \"on and off\" for the last few years. A friend of hers had surgery and has had success and this inspired Client. She is considering the Gastric Sleeve " "procedure. Things with work are slower and she has more time and energy to devote to herself; she will be able to prioritize herself and her health. She stated, \"Mentally, I just feel ready. I don't think I've been to that point before.\" She wants to be able to be healthy and live a long life for her children. She wants to be able to keep up with her kids and move more easily.  She would like to feel more confident and to be able to go to the gym and not feel self-conscious. She tries to walk around as much as she can when she has time (not necessarily exercising).    Risks of Surgery: The client reported that she understands the risks of surgery to include: not coming out of anesthesia, bleeding out, and blood clots.     Lifestyle Changes: The client reported that lifestyle changes that she needs to make include decreasing portion sizes, discontinuing liquids with meals, exercising, continuing with follow-up visits, and attending support groups. She reported that she is most concerned about just getting through surgery (she is afraid of being put under and just having surgery in general).     Support Post-Surgery: The client stated that her  will be present to help her after surgery. He is supportive of her. She stated that she has told her  and immediate famly about the surgery and that they all responded positively. Her friend who had the surgery is also aware of her plans.    The client reported that her current stressors include: work stress; 3 kids      Current Goals with Nutritionist:The client's current goals with her nutritionist include: she has protein shakes in the morning and tries to have something for lunch (toast, cheese, salads) and to eat a balanced dinner (trying to be sure she is eating 3 times per day); taking 20-30 minutes to eat and chewing food thoroughly; cutting carbonation and caffeine (for 2 months); avoiding liquids before/during/after meals; taking vitamins, increasing " water intake       Intervention:   CBT: positive reinforcement  Emotion Focused Therapy: emotion checking  Motivational Interviewing: open ended questions        ASSESSMENT: Current Emotional / Mental Status (status of significant symptoms):   Risk status (Self / Other harm or suicidal ideation)   Patient denies current fears or concerns for personal safety.   Patient denies current or recent suicidal ideation or behaviors.   Patient denies current or recent homicidal ideation or behaviors.   Patient denies current or recent self injurious behavior or ideation.   Patient denies other safety concerns.   Patient reports there has been no change in risk factors since their last session.     Patient reports there has been no change in protective factors since their last session.     Recommended that patient call 911 or go to the local ED should there be a change in any of these risk factors.     Appearance:   Unable to assess on phone    Eye Contact:   Unable to assess on phone   Psychomotor Behavior: Unable to assess on phone   Attitude:   Cooperative    Orientation:   All   Speech    Rate / Production: Normal     Volume:  Normal    Mood:    Normal   Affect:    Appropriate    Thought Content:  Clear    Thought Form:  Coherent  Logical    Insight:    Good      Medication Review:   Client is prescribed Xanax, Sertraline and Wellbutrin     Medication Compliance:   Yes     Changes in Health Issues:   None reported     Chemical Use Review:   Substance Use: Chemical use reviewed, no active concerns identified      Tobacco Use: No current tobacco use.      Diagnosis:   (F41.8) Other Specified Anxiety Disorder     Collateral Reports Completed:   Not Applicable    PLAN: (Patient Tasks / Therapist Tasks / Other)  The client plans to continue following with nutritionist's recommendations (see above); and increase exercise (walk for 15-20 minutes 1-2 times per week).    I plan to see the client for her scheduled follow-up  appointment to discuss her  progress and any struggles with her goals.     Shirley Stevenson, PhD, LP        3/17/2021

## 2021-03-30 ENCOUNTER — VIRTUAL VISIT (OUTPATIENT)
Dept: ENDOCRINOLOGY | Facility: CLINIC | Age: 36
End: 2021-03-30
Payer: COMMERCIAL

## 2021-03-30 DIAGNOSIS — E66.01 CLASS 3 SEVERE OBESITY DUE TO EXCESS CALORIES WITH SERIOUS COMORBIDITY AND BODY MASS INDEX (BMI) OF 60.0 TO 69.9 IN ADULT (H): Primary | ICD-10-CM

## 2021-03-30 DIAGNOSIS — E55.9 VITAMIN D DEFICIENCY: ICD-10-CM

## 2021-03-30 DIAGNOSIS — E66.813 CLASS 3 SEVERE OBESITY DUE TO EXCESS CALORIES WITH SERIOUS COMORBIDITY AND BODY MASS INDEX (BMI) OF 60.0 TO 69.9 IN ADULT (H): Primary | ICD-10-CM

## 2021-03-30 DIAGNOSIS — Z71.3 NUTRITIONAL COUNSELING: ICD-10-CM

## 2021-03-30 PROCEDURE — 97803 MED NUTRITION INDIV SUBSEQ: CPT | Mod: 95 | Performed by: DIETITIAN, REGISTERED

## 2021-03-30 RX ORDER — GLUCOSAMINE HCL 500 MG
1 TABLET ORAL DAILY
Qty: 30 TABLET | Refills: 11 | Status: SHIPPED | OUTPATIENT
Start: 2021-03-30

## 2021-03-30 NOTE — PROGRESS NOTES
"Grecia Lau is a 35 year old who is being evaluated via a billable telephone visit.     The patient has been notified of following:     \"This telephone visit will be conducted via a call between you and your physician/provider. We have found that certain health care needs can be provided without the need for a physical exam.  This service lets us provide the care you need with a short phone conversation.  If a prescription is necessary we can send it directly to your pharmacy.  If lab work is needed we can place an order for that and you can then stop by our lab to have the test done at a later time.    Telephone visits are billed at different rates depending on your insurance coverage. During this emergency period, for some insurers they may be billed the same as an in-person visit.  Please reach out to your insurance provider with any questions.    If during the course of the call the physician/provider feels a telephone visit is not appropriate, you will not be charged for this service.\"    Patient has given verbal consent for Telephone visit?  Yes    What phone number would you like to be contacted at? [phone]    How would you like to obtain your AVS? MyChart    Phone call duration: 12 minutes    During this virtual visit the patient is located in [place], patient verifies this as the location during the entirety of this visit.     Phone Start Time: 9:56 AM  Phone End Time: 10:08 AM      Return Bariatric Nutrition Consultation Note    Reason For Visit: Nutrition Assessment    Grecia LAU is a 35 year old presenting today for follow-up bariatric nutrition consult.   Pt is interested in laparoscopic sleeve gastrectomy with Dr. Gomez expected surgery in [facility].  Patient is accompanied by self.  This is pt's 5th of 3 required nutrition visits prior to surgery.     Pt referred by Cierra Caballero NP on August 11, 2020.  Patient with Co-morbidities of obesity including:  Type II DM no  Renal Failure no  Sleep " "apnea no  Hypertension no   Dyslipidemia no  Joint pain no  Back pain no  GERD yes     Support System Reviewed With Patient 8/6/2020   Who do you have in your support network that can be available to help you for the first 2 weeks after surgery? Yes.   Who can you count on for support throughout your weight loss surgery journey? Yes.       ANTHROPOMETRICS:  Estimated body mass index is 64.22 kg/m  as calculated from the following:    Height as of 3/8/21: 1.702 m (5' 7\").    Weight as of 8/11/20: 186 kg (410 lb).   No recent weight--> Anxiexty about getting on the scale     Required weight loss goal pre-op: 41 lbs from initial consult weight (goal weight 369 lbs or less before surgery)       8/6/2020   I have tried the following methods to lose weight Watching portions or calories, Exercise, Slimfast       Weight Loss Questions Reviewed With Patient 8/6/2020   How long have you been overweight? From Middle age and beyond       SUPPLEMENT INFORMATION:  Chewable flintstone complete     Was taking vitamin D, not taking recently.     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 Works [employer].   At home she has 3 kids and a , and feel like she has no time for herself.     She has previously lost weight with meal prepping but it is not always sustainable. She grabs and goes during the work day, some day she does not eat until diner time. Will snack before and after dinner.      Today: Doing well today.     Diet Recall: per last RD visit    Shake   Meal prep for lunch: salad with chicken,   Dinner: Meat, starch vegetables or whatever family makes.    Meal prepping      Not ready for surgery yet, wanting to make     Progress Towards Previous Goals:  1. Eat slowly (20-30 minutes per meal), chewing foods well (25 chews per bite/applesauce consistency)-Improving, been practicing, remember to drink slowly   2. 9\" Plate method (1/2 " plate non-starchy vegetables/fruit, 1/4 plate lean protein, 1/4 plate whole grain starch - no more than 1/2 cup carb/meal)-Met  3. Eat 3 meals per day-Met  - Breakfast try a protein shake as discussed, try blending it, okay to add 1/2 a banana or 1/2 cup of fruit.     *Protein Shake Criteria: no more than 210 Calories, at least 20 grams of protein, and less than 10 grams of sugar     4. Continues prepping lunches -Met    Relating to beverages:  Continues to avoid caffeine/carbonation/calorie containing beverages-Met, no bubbler water.   Separate fluids from meals by 30 minutes before, during, and after eating-Continues     Relating to dietary supplements:  Continues with  multivitamin containing iron daily -Met    Continues Walk/exercise for 15-30 minutes daily-Met      Eating Habits 8/6/2020   Do you have any dietary restrictions? No   Do you currently binge eat (eat a large amount of food in a short time)? Yes   Are you an emotional eater? Yes   Do you get up to eat after falling asleep? No   What foods do you crave? Junk.       ADDITIONAL INFORMATION:    Dining Out History Reviewed With Patient 8/6/2020   How often do you dine out? Rarely.   Where do you dine out? (select all that apply) take out   What types of food do you order when you dine out? Chicken Sand's, fries       Physical Activity Reviewed With Patient 8/6/2020   How often do you exercise? 3 to 4 times per week   What is the duration of your exercise (in minutes)? 30 Minutes   What types of exercise do you do? walking   What keeps you from being more active?  Pain, Shortness of breath, Worried people will look at me       NUTRITION DIAGNOSIS:  Obesity r/t long history of self-monitoring deficit and excessive energy intake aeb BMI >30 kg/m2.    INTERVENTION:  Intervention Provided/Education:   1. Provided encouragement and support.   2. Briefly Reviewed post-op diet guidelines, GI anatomy of bariatric surgeries, ways to help prepare for post-op diet  "guidelines pre-operatively, portion/calorie-control, mindful eating and sources of protein.   3. Dicussed tasklist and next step for surgery    4. Dicussed anxiety and referral placed to health psychology.   5. AVS via [employer]     Questions Reviewed With Patient 2020   How ready are you to make changes regarding your weight? Number 1 = Not ready at all to make changes up to 10 = very ready. 10   How confident are you that you can change? 1 = Not confident that you will be successful making changes up to 10 = very confident. 10       Patient Understanding: good  Expected Compliance: good    GOALS:  Relating To Eatin. Eat slowly (20-30 minutes per meal), chewing foods well (25 chews per bite/applesauce consistency)  2. 9\" Plate method (1/2 plate non-starchy vegetables/fruit, 1/4 plate lean protein, 1/4 plate whole grain starch - no more than 1/2 cup carb/meal)  3. Eat 3 meals per day  - Breakfast try a protein shake as discussed, try blending it, okay to add 1/2 a banana or 1/2 cup of fruit.     *Protein Shake Criteria: no more than 210 Calories, at least 20 grams of protein, and less than 10 grams of sugar     4. Continues prepping lunches     Relating to beverages:  Continues to avoid caffeine/carbonation/calorie containing beverages  Separate fluids from meals by 30 minutes before, during, and after eating    Relating to dietary supplements:  Continues with  multivitamin containing iron daily     Continues Walk/exercise for 15-30 minutes daily    Health Psychologist and Counseling center: Referral placed. Please call to set up appointment    [facility]   Page Sher, PhD,LP           [phone]  Kristyn Lyman PhD (cannot accept Medicare) [phone]  Ashwin Travis: [phone]  Corina Anderson: [phone]  Alex Pandya: [phone]    [facility]: [phone]      Time spent with patient: 12 minutes.  Haile Rosado, MS, RD, LD  "

## 2021-03-30 NOTE — LETTER
"3/30/2021       RE: Grecia LAU  [address] [facility] [address]     Dear Colleague,    Thank you for referring your patient, Grecia LAU, to the [facility] [place] at [facility]. Please see a copy of my visit note below.    Grecia Lau is a 35 year old who is being evaluated via a billable telephone visit.     The patient has been notified of following:     \"This telephone visit will be conducted via a call between you and your physician/provider. We have found that certain health care needs can be provided without the need for a physical exam.  This service lets us provide the care you need with a short phone conversation.  If a prescription is necessary we can send it directly to your pharmacy.  If lab work is needed we can place an order for that and you can then stop by our lab to have the test done at a later time.    Telephone visits are billed at different rates depending on your insurance coverage. During this emergency period, for some insurers they may be billed the same as an in-person visit.  Please reach out to your insurance provider with any questions.    If during the course of the call the physician/provider feels a telephone visit is not appropriate, you will not be charged for this service.\"    Patient has given verbal consent for Telephone visit?  Yes    What phone number would you like to be contacted at? [phone]    How would you like to obtain your AVS? [employer]    Phone call duration: 12 minutes    During this virtual visit the patient is located in [place], patient verifies this as the location during the entirety of this visit.     Phone Start Time: 9:56 AM  Phone End Time: 10:08 AM      Return Bariatric Nutrition Consultation Note    Reason For Visit: Nutrition Assessment    Grecia LAU is a 35 year old presenting today for follow-up bariatric nutrition consult.   Pt is " "interested in laparoscopic sleeve gastrectomy with Dr. Gomez expected surgery in [place].  Patient is accompanied by self.  This is pt's 5th of 3 required nutrition visits prior to surgery.     Pt referred by Cierra Caballero NP on August 11, 2020.  Patient with Co-morbidities of obesity including:  Type II DM no  Renal Failure no  Sleep apnea no  Hypertension no   Dyslipidemia no  Joint pain no  Back pain no  GERD yes     Support System Reviewed With Patient 8/6/2020   Who do you have in your support network that can be available to help you for the first 2 weeks after surgery? Yes.   Who can you count on for support throughout your weight loss surgery journey? Yes.       ANTHROPOMETRICS:  Estimated body mass index is 64.22 kg/m  as calculated from the following:    Height as of 3/8/21: 1.702 m (5' 7\").    Weight as of 8/11/20: 186 kg (410 lb).   No recent weight--> Anxiexty about getting on the scale     Required weight loss goal pre-op: 41 lbs from initial consult weight (goal weight 369 lbs or less before surgery)       8/6/2020   I have tried the following methods to lose weight Watching portions or calories, Exercise, Slimfast       Weight Loss Questions Reviewed With Patient 8/6/2020   How long have you been overweight? From Middle age and beyond       SUPPLEMENT INFORMATION:  Chewable flintstone complete     Was taking vitamin D, not taking recently.     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   At home she has 3 kids and a , and feel like she has no time for herself.     She has previously lost weight with meal prepping but it is not always sustainable. She grabs and goes during the work day, some day she does not eat until diner time. Will snack before and after dinner.      Today: Doing well today.     Diet Recall: per last RD visit    Shake   Meal prep for lunch: salad with chicken,   Dinner: " "Meat, starch vegetables or whatever family makes.    Meal prepping      Not ready for surgery yet, wanting to make     Progress Towards Previous Goals:  1. Eat slowly (20-30 minutes per meal), chewing foods well (25 chews per bite/applesauce consistency)-Improving, been practicing, remember to drink slowly   2. 9\" Plate method (1/2 plate non-starchy vegetables/fruit, 1/4 plate lean protein, 1/4 plate whole grain starch - no more than 1/2 cup carb/meal)-Met  3. Eat 3 meals per day-Met  - Breakfast try a protein shake as discussed, try blending it, okay to add 1/2 a banana or 1/2 cup of fruit.     *Protein Shake Criteria: no more than 210 Calories, at least 20 grams of protein, and less than 10 grams of sugar     4. Continues prepping lunches -Met    Relating to beverages:  Continues to avoid caffeine/carbonation/calorie containing beverages-Met, no bubbler water.   Separate fluids from meals by 30 minutes before, during, and after eating-Continues     Relating to dietary supplements:  Continues with  multivitamin containing iron daily -Met    Continues Walk/exercise for 15-30 minutes daily-Met      Eating Habits 8/6/2020   Do you have any dietary restrictions? No   Do you currently binge eat (eat a large amount of food in a short time)? Yes   Are you an emotional eater? Yes   Do you get up to eat after falling asleep? No   What foods do you crave? Junk.       ADDITIONAL INFORMATION:    Dining Out History Reviewed With Patient 8/6/2020   How often do you dine out? Rarely.   Where do you dine out? (select all that apply) take out   What types of food do you order when you dine out? Chicken Sand's, fries       Physical Activity Reviewed With Patient 8/6/2020   How often do you exercise? 3 to 4 times per week   What is the duration of your exercise (in minutes)? 30 Minutes   What types of exercise do you do? walking   What keeps you from being more active?  Pain, Shortness of breath, Worried people will look at me " "      NUTRITION DIAGNOSIS:  Obesity r/t long history of self-monitoring deficit and excessive energy intake aeb BMI >30 kg/m2.    INTERVENTION:  Intervention Provided/Education:   1. Provided encouragement and support.   2. Briefly Reviewed post-op diet guidelines, GI anatomy of bariatric surgeries, ways to help prepare for post-op diet guidelines pre-operatively, portion/calorie-control, mindful eating and sources of protein.   3. Dicussed tasklist and next step for surgery    4. Dicussed anxiety and referral placed to health psychology.   5. AVS via [email or web address]     Questions Reviewed With Patient 2020   How ready are you to make changes regarding your weight? Number 1 = Not ready at all to make changes up to 10 = very ready. 10   How confident are you that you can change? 1 = Not confident that you will be successful making changes up to 10 = very confident. 10       Patient Understanding: good  Expected Compliance: good    GOALS:  Relating To Eatin. Eat slowly (20-30 minutes per meal), chewing foods well (25 chews per bite/applesauce consistency)  2. 9\" Plate method (1/2 plate non-starchy vegetables/fruit, 1/4 plate lean protein, 1/4 plate whole grain starch - no more than 1/2 cup carb/meal)  3. Eat 3 meals per day  - Breakfast try a protein shake as discussed, try blending it, okay to add 1/2 a banana or 1/2 cup of fruit.     *Protein Shake Criteria: no more than 210 Calories, at least 20 grams of protein, and less than 10 grams of sugar     4. Continues prepping lunches     Relating to beverages:  Continues to avoid caffeine/carbonation/calorie containing beverages  Separate fluids from meals by 30 minutes before, during, and after eating    Relating to dietary supplements:  Continues with  multivitamin containing iron daily     Continues Walk/exercise for 15-30 minutes daily    Health Psychologist and Counseling center: Referral placed. Please call to set up appointment    [facility]   Page Sher, PhD,           [phone]  Kristny Lyman PhD (cannot accept Medicare) [phone]  Adrienneandrew Thaddeus: [phone]  Corina Anderson: [phone]  Alex Pandya: [phone]    [facility]: [phone]      Time spent with patient: 12 minutes.  Halie Rosado, MS, RD, LD

## 2021-04-06 ENCOUNTER — VIRTUAL VISIT (OUTPATIENT)
Dept: PSYCHOLOGY | Facility: CLINIC | Age: 36
End: 2021-04-06
Payer: COMMERCIAL

## 2021-04-06 DIAGNOSIS — F41.9 ANXIETY: Primary | ICD-10-CM

## 2021-04-06 PROCEDURE — 96131 PSYCL TST EVAL PHYS/QHP EA: CPT | Mod: 95 | Performed by: PSYCHOLOGIST

## 2021-04-06 PROCEDURE — 96130 PSYCL TST EVAL PHYS/QHP 1ST: CPT | Mod: 95 | Performed by: PSYCHOLOGIST

## 2021-04-06 NOTE — PROGRESS NOTES
"Progress Note     Client Name: Grecia Lau  Date: 4/6/21  Service Type: Individual - Phone  Session Start Time: 9:00  Session End Time: 9:22   Session Length: 22 minutes   Billing: see below   Session #: 3  Attendees: Client attended alone    The patient has been notified of the following:      \"We have found that certain health care needs can be provided without the need for a face to face visit.  This service lets us provide the care you need with a phone conversation.       I will have full access to your [place] medical record during this entire phone call.   I will be taking notes for your medical record.      Since this is like an office visit, we will bill your insurance company for this service.       There are potential benefits and risks of telephone visits (e.g. limits to patient confidentiality) that differ from in-person visits.?  Confidentiality still applies for telephone services, and nobody will record the visit.  It is important to be in a quiet, private space that is free of distractions (including cell phone or other devices) during the visit.??      If during the course of the call I believe a telephone visit is not appropriate, you will not be charged for this service\"     Consent has been obtained for this service by care team member: Yes       DATA:   Progress Since Last Session (Related to Symptoms / Goals / Homework):  Symptoms: Stable    Homework: Achieved Good progress on goals (see below for more details)      The client reported she has struggled with weight since childhood (around age 10/11). She reported that she ate what she wanted when she wanted. She was pretty active and played outside and was in sports. She noted that she gained weight with each pregnancy (3) and it was harder to lose weight each time.     She has tried losing weight in the past by dieting (these work for a few days and then she loses motivation). She stated, \"Maybe I haven't tried hard enough.\" She has " "done Atkins, low calorie and low carb diets. She stated, \"I didn't really know how to diet. I just thought that I shouldn't eat. I didn't know there was a science behind it.\" The most weight she has lost was 5-10 pounds by counting calories and exercising. She didn't feel comfortable going to the gym so she tried to work out at home. The lat few years have been busy at work \"and I've lost sight of everything.\" Now that things are slowing down I can focus on me again. She reported she has struggled with overeating at meal times, skipping meals, snacking, eating late at night (\"eating whatever I want and not having the time to focus\"). She tends to eat \"whatever is available\" and doesn't prefer sweets or salty foods. Client was eating out 3-5 times per month and now it is down to once per month. She has made more of an effort to do meal planning as of late. She struggles with having enough time to prepare healthy meals (working, 3 kids, etc.).      Client reported the following previous diagnoses which includes: Anxiety Disorder and Depression.  Client reported symptoms began about 10 years ago. She noted that she doesn't know what caused this to start; she wants to do therapy try to \"pinpoint the issues.\"  Client has received mental health services in the past: medication from PCP. She started medication about 10 years ago. Psychiatric Hospitalizations: None.  Client denies a history of civil commitment. Currently, Client is receiving other mental health services. These include medications from PCP (Xanax, Sertraline, and Wellbutrin). Client has been referred for therapy. She noted that when she \"thinks about things\" she can feel anxious (\"situational\"). For instance, if she thinks about upcoming surgery she will think about \"the worst thing happening.\" She feels things have gotten a lot better (she and her  were  for a few years but they have reconciled and things have \"fallen back into place\") and " "she is feeling better and doing better. Her children were having a hard time adjusting to the pandemic because they are social and \"popular\" and wanted to see their friends. She feels she has focused more on making sure they are okay and has been less concerned about herself (she is on the \"back burner\"). She did not report a history of chemical dependence.    The client reported that she has been thinking about bariatric surgery for \"on and off\" for the last few years. A friend of hers had surgery and has had success and this inspired Client. She is considering the Gastric Sleeve procedure. Things with work are slower and she has more time and energy to devote to herself; she will be able to prioritize herself and her health. She stated, \"Mentally, I just feel ready. I don't think I've been to that point before.\" She wants to be able to be healthy and live a long life for her children. She wants to be able to keep up with her kids and move more easily.  She would like to feel more confident and to be able to go to the gym and not feel self-conscious. She tries to walk around as much as she can when she has time (not necessarily exercising). Client believes a surgical weight reduction procedure will benefit her by allowing her to participate more comfortably in physical activities along with aiding in activity and food changes needed for a healthier lifestyle. Client believes that bariatric surgery is her best option for maintaining weight loss over time.     Since starting working with the [facility], Client has worked closely with her bariatric team in making some important changes to eating habits and activity level along with addressing medical issues. Initial changes made include: having protein shakes in the morning and eating something for lunch (toast, cheese, salads) and eating a balanced dinner (trying to be sure she is eating 3 times per day); taking 20-30 minutes to eat and " "chewing food thoroughly; cutting carbonation and caffeine (for 2 months); avoiding liquids before/during/after meals; taking vitamins, increasing water intake.     Client reported a clear understanding of the risks of surgery, which she has considered against the risks of not pursuing surgery. She believes she is ready and well informed at this time. She understands the need for ongoing lifestyle changes to eating and activity patterns, as well as the need for lifelong vitamin supplementation. The client stated that her  will be present to help her after surgery. He is supportive of her. She stated that she has told her  and immediate family about the surgery and that they all responded positively. Her friend who had the surgery is also aware of her plans.    Current / Ongoing Stressors and Concerns:  Work stress; parenting 3 kids      Treatment Objective(s) Addressed in This Session:   -Complete psychological assessment for bariatric surgery  -Provide feedback about results and support for psychological needs that may arise during process     Intervention:     Completed one month follow-up appointment for pre-bariatric psychological assessment. Reviewed client's progress with homework over the past month. Client reported that she has had success with continuing with having protein shakes for breakfast and salads for lunches and meats and vegetables for dinner (staying away from starches). She is chewing her food thoroughly and avoiding liquids with meals. She snacks \"once in a while\" but if she gets hungry she has vegetables or fruit; she has crackers as well. She has not been drinking carbonation or caffeine. She noted that she has been exercising more; she and her kids take their dog on a walk every night for at least a mile (for at least 15-20 minutes). She feels more motivated to go do things. She wants to prioritize exercising on a daily basis (she has a busy life and weather can impact this). "     Discussed changes that may occur in relationships following weight loss surgery and how to manage these changes. Client reported that she has a very supportive family. She has already talked to family members about anticipated changes.     Reviewed results of the MMPI-2 evaluation (see Documentation Only note dated 3/10/21 for complete interpretation). Responses yielded a profile that suggests a valid profile and low levels of general emotional distress. This was consistent with Client s report during the interview sessions. That is, she is not currently endorsing significant emotional distress.     Questions were answered along with a discussion about results of this psychological evaluation adding insight into fostering ways to reach health goals and post-surgical recovery (e.g., reaching out to friends and family for support, building up healthy strategies to cope with stress, etc).      Reviewed results of evaluation and recommendations. Client agreed with results and had no further questions or concerns at this time.         ASSESSMENT: Current Emotional / Mental Status (status of significant symptoms):  Risk status (Self / Other harm or suicidal ideation)  Client denies current fears or concerns for personal safety.  Client denies current or recent suicidal ideation or behaviors.  Client denies current or recent homicidal ideation or behaviors.  Client denies current or recent self-injurious behavior or ideation.  Client denies other safety concerns.  A safety and risk management plan has not been developed at this time, however client was given the after-hours number should there be a change in any of these risk factors.     Appearance: Unable to assess on phone   Eye Contact: Unable to assess on phone  Psychomotor Behavior: Unable to assess on phone  Attitude: Cooperative   Orientation: All  Speech  Rate / Production: Normal   Volume: Normal   Mood: Normal  Affect: Appropriate   Thought Content: Clear  "  Thought Form: Coherent Logical   Insight: Good          Medication Review:  Client is currently prescribed Xanax, Sertraline, and Wellbutrin by PCP     Medication Compliance:  Yes      Changes in Health Issues:  None reported     Chemical Use Review:  Substance Use: Chemical use reviewed, no active concerns identified.   Tobacco Use: No current or history of tobacco use.      Collateral Reports Completed:  MMPI-2     Summary and Final Recommendation:  From a psychological standpoint, Client is cleared to continue in the process for pursuing bariatric surgery. To summarize the 3 primary conditions being evaluated in the psychological assessment:     1. Client is prepared to comply with ongoing aftercare and lifestyle changes after surgery--condition satisfied    Client has made some meaningful initial changes to eating and activity habits. Client reported an understanding of the need for lifestyle changes to eating and activity habits. Client expressed a willingness to maintain lifestyle changes to eating and activity habits after surgery.      2. Client is emotionally stable to proceed with surgery--condition satisfied    Client reported the following previous mental health diagnoses which include: Anxiety Disorder and Depression. Client reported symptoms began about 10 years ago. She noted that she doesn't know what caused this to start; she wants to do therapy try to \"pinpoint the issues.\" She has received mental health services in the past: medication from PCP (she started medication about 10 years ago). Psychiatric Hospitalizations: None. Client denies a history of civil commitment. Currently, Client is receiving other mental health services. These include medications from PCP (Xanax, Sertraline, and Wellbutrin). Client has been referred for therapy. She has not yet scheduled but was planning to do so today. Writer will place another order for therapy so that the intake office can reach out to her to get her " "scheduled. She noted that when she \"thinks about things\" she can feel anxious (\"situational\"). For instance, if she thinks about upcoming surgery she will think about \"the worst thing happening.\" She feels things have gotten a lot better (she and her  were  for a few years but they have reconciled and things have \"fallen back into place\") and she is feeling better and doing better. Client s mental health condition is evaluated as stable and adequately managed at this time. She is not reporting current distress or significant anxiety symptomology. Client is functioning generally well in daily life and adequately managing current stressors. This is consistent with information obtained on the MMPI-2 and through the clinical interview.     3. Client is cognitively capable of understanding the risks of the procedure--condition satisfied  Client has demonstrated understanding of the risks of surgery compared to the risks of not having surgery.      PLAN: (Client Tasks / Therapist Tasks / Other)  1. Client has completed the psychological assessment required for bariatric surgery. This summary will be forwarded to the weight loss surgery clinic for review. Client will continue to work with bariatric team in preparation for surgery.  2. Recommend standard post-surgical follow up, with sessions 1 and 3 months post-surgery, to assess Client's functioning and adjustment post-surgical lifestyle.   3. Client was encouraged to attend a weight loss surgery support group for additional accountability and support. Client was provided with my contact information and is aware that I can be contacted sooner than post-surgery session if needed.     Shirley Stevenson, PhD, LP    Psychological Testing   Billing/Services Summary       Testing Evaluation Services Base: 99945  (1st 60 mins) Add-on: 17877  (each addtl 60 mins)   Record Review and Clarify Referral Question   (8:45/9:00), (3/2/21) 15 minutes   Integration/Report " Generation   (11:00/12:00), (3/10/21) - MMPI-2  (3:00/4:00), (4/5/21) - Report 60 minutes  60 minutes     Interactive Feedback Session  (9:00/9:22), (4/6/21) 22 minutes   Total Time: 157 minutes (2 hours, 37 minutes)   Total Units: 1 2           Diagnosis(es): (ICD-10)  300.09 (F41.8) Other Specified Anxiety Disorder  Depression in remission

## 2021-05-17 NOTE — PATIENT INSTRUCTIONS
"GOALS:  Relating To Eatin. Eat slowly (20-30 minutes per meal), chewing foods well (25 chews per bite/applesauce consistency)  2. 9\" Plate method (1/2 plate non-starchy vegetables/fruit, 1/4 plate lean protein, 1/4 plate whole grain starch - no more than 1/2 cup carb/meal)  3. Eat 3 meals per day  - Breakfast try a protein shake as discussed, try blending it, okay to add 1/2 a banana or 1/2 cup of fruit.     *Protein Shake Criteria: no more than 210 Calories, at least 20 grams of protein, and less than 10 grams of sugar     4. Continues prepping lunches     Relating to beverages:  Continues to avoid caffeine/carbonation/calorie containing beverages  Separate fluids from meals by 30 minutes before, during, and after eating    Relating to dietary supplements:  Continues with  multivitamin containing iron daily     Continues Walk/exercise for 15-30 minutes daily    [facility]: Referral placed. Please call to set up appointment    [facility]   Page Sher, PhD,LP    [phone]  Kristyn Lyman PhD (cannot accept Medicare) [phone]  Ashwin Travis: [phone]  Corina Anderson: [phone]  Alex Pandya: [phone]    [facility]: [phone]    "

## 2021-09-25 ENCOUNTER — HEALTH MAINTENANCE LETTER (OUTPATIENT)
Age: 36
End: 2021-09-25

## 2022-01-15 ENCOUNTER — HEALTH MAINTENANCE LETTER (OUTPATIENT)
Age: 37
End: 2022-01-15

## 2023-04-22 ENCOUNTER — HEALTH MAINTENANCE LETTER (OUTPATIENT)
Age: 38
End: 2023-04-22